# Patient Record
Sex: FEMALE | Race: WHITE | NOT HISPANIC OR LATINO | Employment: FULL TIME | URBAN - METROPOLITAN AREA
[De-identification: names, ages, dates, MRNs, and addresses within clinical notes are randomized per-mention and may not be internally consistent; named-entity substitution may affect disease eponyms.]

---

## 2017-07-13 RX ORDER — METOPROLOL SUCCINATE 50 MG/1
TABLET, EXTENDED RELEASE ORAL
Qty: 90 TABLET | Refills: 3 | OUTPATIENT
Start: 2017-07-13

## 2019-05-06 ENCOUNTER — APPOINTMENT (OUTPATIENT)
Dept: PREADMISSION TESTING | Facility: HOSPITAL | Age: 69
End: 2019-05-06

## 2019-05-06 VITALS
HEART RATE: 77 BPM | TEMPERATURE: 97.6 F | RESPIRATION RATE: 16 BRPM | BODY MASS INDEX: 31.66 KG/M2 | SYSTOLIC BLOOD PRESSURE: 148 MMHG | DIASTOLIC BLOOD PRESSURE: 86 MMHG | WEIGHT: 185.44 LBS | OXYGEN SATURATION: 100 % | HEIGHT: 64 IN

## 2019-05-06 LAB
ALBUMIN SERPL-MCNC: 4.2 G/DL (ref 3.5–5.2)
ALBUMIN/GLOB SERPL: 1.3 G/DL
ALP SERPL-CCNC: 86 U/L (ref 39–117)
ALT SERPL W P-5'-P-CCNC: 18 U/L (ref 1–33)
ANION GAP SERPL CALCULATED.3IONS-SCNC: 12.1 MMOL/L
AST SERPL-CCNC: 21 U/L (ref 1–32)
BASOPHILS # BLD AUTO: 0.06 10*3/MM3 (ref 0–0.2)
BASOPHILS NFR BLD AUTO: 0.6 % (ref 0–1.5)
BILIRUB SERPL-MCNC: 0.6 MG/DL (ref 0.2–1.2)
BUN BLD-MCNC: 11 MG/DL (ref 8–23)
BUN/CREAT SERPL: 16.2 (ref 7–25)
CALCIUM SPEC-SCNC: 9.2 MG/DL (ref 8.6–10.5)
CHLORIDE SERPL-SCNC: 101 MMOL/L (ref 98–107)
CO2 SERPL-SCNC: 23.9 MMOL/L (ref 22–29)
CREAT BLD-MCNC: 0.68 MG/DL (ref 0.57–1)
DEPRECATED RDW RBC AUTO: 45.7 FL (ref 37–54)
EOSINOPHIL # BLD AUTO: 0.19 10*3/MM3 (ref 0–0.4)
EOSINOPHIL NFR BLD AUTO: 2 % (ref 0.3–6.2)
ERYTHROCYTE [DISTWIDTH] IN BLOOD BY AUTOMATED COUNT: 13.3 % (ref 12.3–15.4)
GFR SERPL CREATININE-BSD FRML MDRD: 86 ML/MIN/1.73
GLOBULIN UR ELPH-MCNC: 3.2 GM/DL
GLUCOSE BLD-MCNC: 95 MG/DL (ref 65–99)
HCT VFR BLD AUTO: 41 % (ref 34–46.6)
HGB BLD-MCNC: 12.9 G/DL (ref 12–15.9)
IMM GRANULOCYTES # BLD AUTO: 0.05 10*3/MM3 (ref 0–0.05)
IMM GRANULOCYTES NFR BLD AUTO: 0.5 % (ref 0–0.5)
LYMPHOCYTES # BLD AUTO: 3.11 10*3/MM3 (ref 0.7–3.1)
LYMPHOCYTES NFR BLD AUTO: 32.5 % (ref 19.6–45.3)
MCH RBC QN AUTO: 29.5 PG (ref 26.6–33)
MCHC RBC AUTO-ENTMCNC: 31.5 G/DL (ref 31.5–35.7)
MCV RBC AUTO: 93.8 FL (ref 79–97)
MONOCYTES # BLD AUTO: 0.71 10*3/MM3 (ref 0.1–0.9)
MONOCYTES NFR BLD AUTO: 7.4 % (ref 5–12)
NEUTROPHILS # BLD AUTO: 5.44 10*3/MM3 (ref 1.7–7)
NEUTROPHILS NFR BLD AUTO: 57 % (ref 42.7–76)
NRBC BLD AUTO-RTO: 0 /100 WBC (ref 0–0.2)
PLATELET # BLD AUTO: 433 10*3/MM3 (ref 140–450)
PMV BLD AUTO: 9.4 FL (ref 6–12)
POTASSIUM BLD-SCNC: 3.7 MMOL/L (ref 3.5–5.2)
PROT SERPL-MCNC: 7.4 G/DL (ref 6–8.5)
RBC # BLD AUTO: 4.37 10*6/MM3 (ref 3.77–5.28)
SODIUM BLD-SCNC: 137 MMOL/L (ref 136–145)
WBC NRBC COR # BLD: 9.56 10*3/MM3 (ref 3.4–10.8)

## 2019-05-06 PROCEDURE — 93010 ELECTROCARDIOGRAM REPORT: CPT | Performed by: INTERNAL MEDICINE

## 2019-05-06 PROCEDURE — 80053 COMPREHEN METABOLIC PANEL: CPT | Performed by: ORTHOPAEDIC SURGERY

## 2019-05-06 PROCEDURE — 36415 COLL VENOUS BLD VENIPUNCTURE: CPT

## 2019-05-06 PROCEDURE — 85025 COMPLETE CBC W/AUTO DIFF WBC: CPT | Performed by: ORTHOPAEDIC SURGERY

## 2019-05-06 PROCEDURE — 93005 ELECTROCARDIOGRAM TRACING: CPT

## 2019-05-06 RX ORDER — CLONIDINE HYDROCHLORIDE 0.2 MG/1
0.2 TABLET ORAL 2 TIMES DAILY
Refills: 3 | COMMUNITY
Start: 2019-04-02 | End: 2019-12-02

## 2019-05-06 RX ORDER — CYCLOBENZAPRINE HCL 10 MG
10 TABLET ORAL 3 TIMES DAILY PRN
Refills: 2 | COMMUNITY
Start: 2019-03-14

## 2019-05-06 RX ORDER — METOPROLOL TARTRATE 50 MG/1
100 TABLET, FILM COATED ORAL 2 TIMES DAILY
Refills: 3 | COMMUNITY
Start: 2019-04-02 | End: 2019-12-02

## 2019-05-06 NOTE — DISCHARGE INSTRUCTIONS
Take the following medications the morning of surgery with a small sip of water:     CLONIDINE  METOPROLOL      General Instructions:  • Do not eat solid food after midnight the night before surgery.  • You may drink clear liquids day of surgery but must stop at least one hour before your hospital arrival time @ 0445 AM STOP DRINKING!!!  • It is beneficial for you to have a clear drink that contains carbohydrates the day of surgery.  We suggest a 12 to 20 ounce bottle of Gatorade or Powerade for non-diabetic patients or a 12 to 20 ounce bottle of G2 or Powerade Zero for diabetic patients.     Clear liquids are liquids you can see through.  Nothing red in color.     Plain water                               Sports drinks  Sodas                                   Gelatin (Jell-O)  Fruit juices without pulp such as white grape juice and apple juice  Popsicles that contain no fruit or yogurt  Tea or coffee (no cream or milk added)  Gatorade / Powerade  G2 / Powerade Zero    • Patients who avoid smoking, chewing tobacco and alcohol for 4 weeks prior to surgery have a reduced risk of post-operative complications.  Quit smoking as many days before surgery as you can.  • Do not smoke, use chewing tobacco or drink alcohol the day of surgery.   • If applicable bring your C-PAP/ BI-PAP machine.  • Bring any papers given to you in the doctor’s office.  • Wear clean comfortable clothes and socks.  • Do not wear contact lenses, false eyelashes or make-up.  Bring a case for your glasses.   • Bring crutches or walker if applicable.  • Remove all piercings.  Leave jewelry and any other valuables at home.  • Hair extensions with metal clips must be removed prior to surgery.  • The Pre-Admission Testing nurse will instruct you to bring medications if unable to obtain an accurate list in Pre-Admission Testing.        Preventing a Surgical Site Infection:  • For 2 to 3 days before surgery, avoid shaving with a razor because the razor can  irritate skin and make it easier to develop an infection.    • Any areas of open skin can increase the risk of a post-operative wound infection by allowing bacteria to enter and travel throughout the body.  Notify your surgeon if you have any skin wounds / rashes even if it is not near the expected surgical site.  The area will need assessed to determine if surgery should be delayed until it is healed.  • The night prior to surgery sleep in a clean bed with clean clothing.  Do not allow pets to sleep with you.  • Shower on the morning of surgery using a fresh bar of anti-bacterial soap (such as Dial) and clean washcloth.  Dry with a clean towel and dress in clean clothing.  • Ask your surgeon if you will be receiving antibiotics prior to surgery.  • Make sure you, your family, and all healthcare providers clean their hands with soap and water or an alcohol based hand  before caring for you or your wound.    Day of surgery: 05- ARRIVE @ 0545 AM REPORT TO THE [facility]   Upon arrival, a Pre-op nurse and Anesthesiologist will review your health history, obtain vital signs, and answer questions you may have.  The only belongings needed at this time will be your home medications and if applicable your C-PAP/BI-PAP machine.  If you are staying overnight your family can leave the rest of your belongings in the car and bring them to your room later.  A Pre-op nurse will start an IV and you may receive medication in preparation for surgery, including something to help you relax.  Your family will be able to see you in the Pre-op area.  While you are in surgery your family should notify the waiting room  if they leave the waiting room area and provide a contact phone number.    Please be aware that surgery does come with discomfort.  We want to make every effort to control your discomfort so please discuss any uncontrolled symptoms with your nurse.   Your doctor will most likely have prescribed pain  medications.      If you are going home after surgery you will receive individualized written care instructions before being discharged.  A responsible adult must drive you to and from the hospital on the day of your surgery and stay with you for 24 hours.    If you are staying overnight following surgery, you will be transported to your hospital room following the recovery period.  [facility] has all private rooms.    You have received a list of surgical assistants for your reference.  If you have any questions please call Pre-Admission Testing at [phone].  Deductibles and co-payments are collected on the day of service. Please be prepared to pay the required co-pay, deductible or deposit on the day of service as defined by your plan.

## 2019-05-20 ENCOUNTER — ANESTHESIA (OUTPATIENT)
Dept: PERIOP | Facility: HOSPITAL | Age: 69
End: 2019-05-20

## 2019-05-20 ENCOUNTER — APPOINTMENT (OUTPATIENT)
Dept: GENERAL RADIOLOGY | Facility: HOSPITAL | Age: 69
End: 2019-05-20

## 2019-05-20 ENCOUNTER — HOSPITAL ENCOUNTER (OUTPATIENT)
Facility: HOSPITAL | Age: 69
Discharge: HOME | End: 2019-05-21
Attending: ORTHOPAEDIC SURGERY | Admitting: ORTHOPAEDIC SURGERY

## 2019-05-20 ENCOUNTER — ANESTHESIA EVENT (OUTPATIENT)
Dept: PERIOP | Facility: HOSPITAL | Age: 69
End: 2019-05-20

## 2019-05-20 DIAGNOSIS — Z74.09 IMPAIRED MOBILITY: Primary | ICD-10-CM

## 2019-05-20 PROBLEM — M19.90 OSTEOARTHRITIS: Status: ACTIVE | Noted: 2019-05-20

## 2019-05-20 PROBLEM — M19.071 OSTEOARTHRITIS OF ANKLE AND FOOT, RIGHT: Status: ACTIVE | Noted: 2019-05-20

## 2019-05-20 PROCEDURE — 25010000002 FENTANYL CITRATE (PF) 100 MCG/2ML SOLUTION: Performed by: ANESTHESIOLOGY

## 2019-05-20 PROCEDURE — 25010000003 BUPIVACAINE LIPOSOME 1.3 % SUSPENSION: Performed by: ANESTHESIOLOGY

## 2019-05-20 PROCEDURE — C9290 INJ, BUPIVACAINE LIPOSOME: HCPCS | Performed by: ANESTHESIOLOGY

## 2019-05-20 PROCEDURE — 25010000002 HYDRALAZINE PER 20 MG: Performed by: NURSE ANESTHETIST, CERTIFIED REGISTERED

## 2019-05-20 PROCEDURE — 25010000003 CEFAZOLIN IN DEXTROSE 2-4 GM/100ML-% SOLUTION: Performed by: ORTHOPAEDIC SURGERY

## 2019-05-20 PROCEDURE — G0378 HOSPITAL OBSERVATION PER HR: HCPCS

## 2019-05-20 PROCEDURE — C1769 GUIDE WIRE: HCPCS | Performed by: ORTHOPAEDIC SURGERY

## 2019-05-20 PROCEDURE — C1713 ANCHOR/SCREW BN/BN,TIS/BN: HCPCS | Performed by: ORTHOPAEDIC SURGERY

## 2019-05-20 PROCEDURE — 76000 FLUOROSCOPY <1 HR PHYS/QHP: CPT

## 2019-05-20 PROCEDURE — 25010000002 PROMETHAZINE PER 50 MG: Performed by: NURSE ANESTHETIST, CERTIFIED REGISTERED

## 2019-05-20 PROCEDURE — 25010000002 ONDANSETRON PER 1 MG: Performed by: NURSE ANESTHETIST, CERTIFIED REGISTERED

## 2019-05-20 PROCEDURE — 25010000002 PROPOFOL 10 MG/ML EMULSION: Performed by: NURSE ANESTHETIST, CERTIFIED REGISTERED

## 2019-05-20 PROCEDURE — 83835 ASSAY OF METANEPHRINES: CPT | Performed by: INTERNAL MEDICINE

## 2019-05-20 PROCEDURE — 73620 X-RAY EXAM OF FOOT: CPT

## 2019-05-20 PROCEDURE — 82384 ASSAY THREE CATECHOLAMINES: CPT | Performed by: INTERNAL MEDICINE

## 2019-05-20 PROCEDURE — 25010000002 MIDAZOLAM PER 1 MG: Performed by: ANESTHESIOLOGY

## 2019-05-20 PROCEDURE — 25010000002 HYDROMORPHONE PER 4 MG: Performed by: NURSE ANESTHETIST, CERTIFIED REGISTERED

## 2019-05-20 PROCEDURE — 25010000002 DEXAMETHASONE PER 1 MG: Performed by: NURSE ANESTHETIST, CERTIFIED REGISTERED

## 2019-05-20 PROCEDURE — 82570 ASSAY OF URINE CREATININE: CPT | Performed by: INTERNAL MEDICINE

## 2019-05-20 DEVICE — SCRW MAXLOCK EXTRM NL 4X26MM: Type: IMPLANTABLE DEVICE | Site: FOOT | Status: FUNCTIONAL

## 2019-05-20 DEVICE — IMPLANTABLE DEVICE: Type: IMPLANTABLE DEVICE | Site: FOOT | Status: FUNCTIONAL

## 2019-05-20 DEVICE — SCRW MAXLOCK EXTRM NL 4X16MM: Type: IMPLANTABLE DEVICE | Site: FOOT | Status: FUNCTIONAL

## 2019-05-20 DEVICE — BONE CANC CHIPS 1/4MM 15CC: Type: IMPLANTABLE DEVICE | Site: FOOT | Status: FUNCTIONAL

## 2019-05-20 RX ORDER — HYDRALAZINE HYDROCHLORIDE 20 MG/ML
5 INJECTION INTRAMUSCULAR; INTRAVENOUS
Status: DISCONTINUED | OUTPATIENT
Start: 2019-05-20 | End: 2019-05-20 | Stop reason: HOSPADM

## 2019-05-20 RX ORDER — PROMETHAZINE HYDROCHLORIDE 25 MG/1
25 SUPPOSITORY RECTAL ONCE AS NEEDED
Status: COMPLETED | OUTPATIENT
Start: 2019-05-20 | End: 2019-05-20

## 2019-05-20 RX ORDER — PROPOFOL 10 MG/ML
VIAL (ML) INTRAVENOUS AS NEEDED
Status: DISCONTINUED | OUTPATIENT
Start: 2019-05-20 | End: 2019-05-20 | Stop reason: SURG

## 2019-05-20 RX ORDER — DIPHENHYDRAMINE HYDROCHLORIDE 50 MG/ML
12.5 INJECTION INTRAMUSCULAR; INTRAVENOUS
Status: DISCONTINUED | OUTPATIENT
Start: 2019-05-20 | End: 2019-05-20 | Stop reason: HOSPADM

## 2019-05-20 RX ORDER — FAMOTIDINE 10 MG/ML
20 INJECTION, SOLUTION INTRAVENOUS ONCE
Status: COMPLETED | OUTPATIENT
Start: 2019-05-20 | End: 2019-05-20

## 2019-05-20 RX ORDER — HYDROMORPHONE HYDROCHLORIDE 1 MG/ML
0.5 INJECTION, SOLUTION INTRAMUSCULAR; INTRAVENOUS; SUBCUTANEOUS
Status: CANCELLED | OUTPATIENT
Start: 2019-05-20

## 2019-05-20 RX ORDER — DIPHENHYDRAMINE HCL 25 MG
25 CAPSULE ORAL
Status: DISCONTINUED | OUTPATIENT
Start: 2019-05-20 | End: 2019-05-20 | Stop reason: HOSPADM

## 2019-05-20 RX ORDER — LIDOCAINE HYDROCHLORIDE 20 MG/ML
INJECTION, SOLUTION INFILTRATION; PERINEURAL AS NEEDED
Status: DISCONTINUED | OUTPATIENT
Start: 2019-05-20 | End: 2019-05-20 | Stop reason: SURG

## 2019-05-20 RX ORDER — LISINOPRIL AND HYDROCHLOROTHIAZIDE 12.5; 1 MG/1; MG/1
1 TABLET ORAL DAILY
COMMUNITY
End: 2023-03-28

## 2019-05-20 RX ORDER — ACETAMINOPHEN 325 MG/1
650 TABLET ORAL ONCE AS NEEDED
Status: DISCONTINUED | OUTPATIENT
Start: 2019-05-20 | End: 2019-05-20 | Stop reason: HOSPADM

## 2019-05-20 RX ORDER — HYDROMORPHONE HYDROCHLORIDE 1 MG/ML
0.5 INJECTION, SOLUTION INTRAMUSCULAR; INTRAVENOUS; SUBCUTANEOUS
Status: DISCONTINUED | OUTPATIENT
Start: 2019-05-20 | End: 2019-05-20 | Stop reason: HOSPADM

## 2019-05-20 RX ORDER — CLONIDINE HYDROCHLORIDE 0.1 MG/1
0.2 TABLET ORAL 2 TIMES DAILY
Status: DISCONTINUED | OUTPATIENT
Start: 2019-05-20 | End: 2019-05-21 | Stop reason: HOSPADM

## 2019-05-20 RX ORDER — HYDROCODONE BITARTRATE AND ACETAMINOPHEN 7.5; 325 MG/1; MG/1
1 TABLET ORAL ONCE AS NEEDED
Status: CANCELLED | OUTPATIENT
Start: 2019-05-20

## 2019-05-20 RX ORDER — PROMETHAZINE HYDROCHLORIDE 25 MG/ML
6.25 INJECTION, SOLUTION INTRAMUSCULAR; INTRAVENOUS ONCE AS NEEDED
Status: CANCELLED | OUTPATIENT
Start: 2019-05-20

## 2019-05-20 RX ORDER — FENTANYL CITRATE 50 UG/ML
100 INJECTION, SOLUTION INTRAMUSCULAR; INTRAVENOUS
Status: DISCONTINUED | OUTPATIENT
Start: 2019-05-20 | End: 2019-05-20 | Stop reason: HOSPADM

## 2019-05-20 RX ORDER — CYCLOBENZAPRINE HCL 10 MG
10 TABLET ORAL 3 TIMES DAILY PRN
Status: DISCONTINUED | OUTPATIENT
Start: 2019-05-20 | End: 2019-05-21 | Stop reason: HOSPADM

## 2019-05-20 RX ORDER — FENTANYL CITRATE 50 UG/ML
50 INJECTION, SOLUTION INTRAMUSCULAR; INTRAVENOUS
Status: DISCONTINUED | OUTPATIENT
Start: 2019-05-20 | End: 2019-05-20 | Stop reason: HOSPADM

## 2019-05-20 RX ORDER — EPHEDRINE SULFATE 50 MG/ML
5 INJECTION, SOLUTION INTRAVENOUS ONCE AS NEEDED
Status: DISCONTINUED | OUTPATIENT
Start: 2019-05-20 | End: 2019-05-20 | Stop reason: HOSPADM

## 2019-05-20 RX ORDER — MIDAZOLAM HYDROCHLORIDE 1 MG/ML
1 INJECTION INTRAMUSCULAR; INTRAVENOUS
Status: DISCONTINUED | OUTPATIENT
Start: 2019-05-20 | End: 2019-05-20 | Stop reason: HOSPADM

## 2019-05-20 RX ORDER — MORPHINE SULFATE 2 MG/ML
8 INJECTION, SOLUTION INTRAMUSCULAR; INTRAVENOUS EVERY 4 HOURS PRN
Status: DISCONTINUED | OUTPATIENT
Start: 2019-05-30 | End: 2019-05-21 | Stop reason: HOSPADM

## 2019-05-20 RX ORDER — ASPIRIN 325 MG
325 TABLET ORAL DAILY
Status: DISCONTINUED | OUTPATIENT
Start: 2019-05-21 | End: 2019-05-21 | Stop reason: HOSPADM

## 2019-05-20 RX ORDER — DEXAMETHASONE SODIUM PHOSPHATE 10 MG/ML
INJECTION INTRAMUSCULAR; INTRAVENOUS AS NEEDED
Status: DISCONTINUED | OUTPATIENT
Start: 2019-05-20 | End: 2019-05-20 | Stop reason: SURG

## 2019-05-20 RX ORDER — CEFAZOLIN SODIUM 2 G/100ML
2 INJECTION, SOLUTION INTRAVENOUS EVERY 8 HOURS
Status: DISCONTINUED | OUTPATIENT
Start: 2019-05-20 | End: 2019-05-21 | Stop reason: HOSPADM

## 2019-05-20 RX ORDER — PROMETHAZINE HYDROCHLORIDE 25 MG/ML
6.25 INJECTION, SOLUTION INTRAMUSCULAR; INTRAVENOUS
Status: COMPLETED | OUTPATIENT
Start: 2019-05-20 | End: 2019-05-20

## 2019-05-20 RX ORDER — OXYCODONE AND ACETAMINOPHEN 7.5; 325 MG/1; MG/1
1 TABLET ORAL ONCE AS NEEDED
Status: DISCONTINUED | OUTPATIENT
Start: 2019-05-20 | End: 2019-05-20 | Stop reason: HOSPADM

## 2019-05-20 RX ORDER — ONDANSETRON 2 MG/ML
4 INJECTION INTRAMUSCULAR; INTRAVENOUS ONCE AS NEEDED
Status: CANCELLED | OUTPATIENT
Start: 2019-05-20

## 2019-05-20 RX ORDER — SODIUM CHLORIDE 0.9 % (FLUSH) 0.9 %
3 SYRINGE (ML) INJECTION EVERY 12 HOURS SCHEDULED
Status: DISCONTINUED | OUTPATIENT
Start: 2019-05-20 | End: 2019-05-20 | Stop reason: HOSPADM

## 2019-05-20 RX ORDER — FENTANYL CITRATE 50 UG/ML
50 INJECTION, SOLUTION INTRAMUSCULAR; INTRAVENOUS
Status: CANCELLED | OUTPATIENT
Start: 2019-05-20

## 2019-05-20 RX ORDER — PROMETHAZINE HYDROCHLORIDE 25 MG/1
25 TABLET ORAL ONCE AS NEEDED
Status: CANCELLED | OUTPATIENT
Start: 2019-05-20

## 2019-05-20 RX ORDER — PROMETHAZINE HYDROCHLORIDE 25 MG/ML
25 INJECTION, SOLUTION INTRAMUSCULAR; INTRAVENOUS EVERY 4 HOURS PRN
Status: DISCONTINUED | OUTPATIENT
Start: 2019-05-20 | End: 2019-05-21 | Stop reason: HOSPADM

## 2019-05-20 RX ORDER — SODIUM CHLORIDE 0.9 % (FLUSH) 0.9 %
1-10 SYRINGE (ML) INJECTION AS NEEDED
Status: DISCONTINUED | OUTPATIENT
Start: 2019-05-20 | End: 2019-05-20 | Stop reason: HOSPADM

## 2019-05-20 RX ORDER — PROMETHAZINE HYDROCHLORIDE 25 MG/1
25 SUPPOSITORY RECTAL ONCE AS NEEDED
Status: CANCELLED | OUTPATIENT
Start: 2019-05-20

## 2019-05-20 RX ORDER — METOPROLOL TARTRATE 100 MG/1
100 TABLET ORAL 2 TIMES DAILY
Status: DISCONTINUED | OUTPATIENT
Start: 2019-05-20 | End: 2019-05-21 | Stop reason: HOSPADM

## 2019-05-20 RX ORDER — ACETAMINOPHEN 650 MG/1
650 SUPPOSITORY RECTAL ONCE AS NEEDED
Status: DISCONTINUED | OUTPATIENT
Start: 2019-05-20 | End: 2019-05-20 | Stop reason: HOSPADM

## 2019-05-20 RX ORDER — BUPIVACAINE HYDROCHLORIDE 2.5 MG/ML
INJECTION, SOLUTION EPIDURAL; INFILTRATION; INTRACAUDAL
Status: COMPLETED | OUTPATIENT
Start: 2019-05-20 | End: 2019-05-20

## 2019-05-20 RX ORDER — FLUMAZENIL 0.1 MG/ML
0.2 INJECTION INTRAVENOUS AS NEEDED
Status: CANCELLED | OUTPATIENT
Start: 2019-05-20

## 2019-05-20 RX ORDER — MORPHINE SULFATE 2 MG/ML
4 INJECTION, SOLUTION INTRAMUSCULAR; INTRAVENOUS EVERY 4 HOURS PRN
Status: DISCONTINUED | OUTPATIENT
Start: 2019-05-20 | End: 2019-05-21 | Stop reason: HOSPADM

## 2019-05-20 RX ORDER — SODIUM CHLORIDE, SODIUM LACTATE, POTASSIUM CHLORIDE, CALCIUM CHLORIDE 600; 310; 30; 20 MG/100ML; MG/100ML; MG/100ML; MG/100ML
9 INJECTION, SOLUTION INTRAVENOUS CONTINUOUS
Status: DISCONTINUED | OUTPATIENT
Start: 2019-05-20 | End: 2019-05-21 | Stop reason: HOSPADM

## 2019-05-20 RX ORDER — PROMETHAZINE HYDROCHLORIDE 25 MG/1
25 TABLET ORAL EVERY 4 HOURS PRN
Status: DISCONTINUED | OUTPATIENT
Start: 2019-05-20 | End: 2019-05-21 | Stop reason: HOSPADM

## 2019-05-20 RX ORDER — CEFAZOLIN SODIUM 2 G/100ML
2 INJECTION, SOLUTION INTRAVENOUS ONCE
Status: COMPLETED | OUTPATIENT
Start: 2019-05-20 | End: 2019-05-20

## 2019-05-20 RX ORDER — BUPIVACAINE HYDROCHLORIDE 5 MG/ML
INJECTION, SOLUTION EPIDURAL; INTRACAUDAL
Status: COMPLETED | OUTPATIENT
Start: 2019-05-20 | End: 2019-05-20

## 2019-05-20 RX ORDER — ONDANSETRON 2 MG/ML
4 INJECTION INTRAMUSCULAR; INTRAVENOUS ONCE AS NEEDED
Status: DISCONTINUED | OUTPATIENT
Start: 2019-05-20 | End: 2019-05-20 | Stop reason: HOSPADM

## 2019-05-20 RX ORDER — LIDOCAINE HYDROCHLORIDE 10 MG/ML
0.5 INJECTION, SOLUTION EPIDURAL; INFILTRATION; INTRACAUDAL; PERINEURAL ONCE AS NEEDED
Status: DISCONTINUED | OUTPATIENT
Start: 2019-05-20 | End: 2019-05-20 | Stop reason: HOSPADM

## 2019-05-20 RX ORDER — HYDROCODONE BITARTRATE AND ACETAMINOPHEN 7.5; 325 MG/1; MG/1
1 TABLET ORAL ONCE AS NEEDED
Status: DISCONTINUED | OUTPATIENT
Start: 2019-05-20 | End: 2019-05-20 | Stop reason: HOSPADM

## 2019-05-20 RX ORDER — OXYCODONE HYDROCHLORIDE AND ACETAMINOPHEN 5; 325 MG/1; MG/1
2 TABLET ORAL EVERY 4 HOURS PRN
Status: DISCONTINUED | OUTPATIENT
Start: 2019-05-20 | End: 2019-05-21 | Stop reason: HOSPADM

## 2019-05-20 RX ORDER — MIDAZOLAM HYDROCHLORIDE 1 MG/ML
2 INJECTION INTRAMUSCULAR; INTRAVENOUS
Status: DISCONTINUED | OUTPATIENT
Start: 2019-05-20 | End: 2019-05-20 | Stop reason: HOSPADM

## 2019-05-20 RX ORDER — PROMETHAZINE HYDROCHLORIDE 25 MG/1
25 TABLET ORAL ONCE AS NEEDED
Status: COMPLETED | OUTPATIENT
Start: 2019-05-20 | End: 2019-05-20

## 2019-05-20 RX ORDER — BACITRACIN 50000 [IU]/1
INJECTION, POWDER, FOR SOLUTION INTRAMUSCULAR
Status: DISPENSED
Start: 2019-05-20 | End: 2019-05-20

## 2019-05-20 RX ORDER — LABETALOL HYDROCHLORIDE 5 MG/ML
5 INJECTION, SOLUTION INTRAVENOUS
Status: DISCONTINUED | OUTPATIENT
Start: 2019-05-20 | End: 2019-05-20 | Stop reason: HOSPADM

## 2019-05-20 RX ORDER — EPHEDRINE SULFATE 50 MG/ML
5 INJECTION, SOLUTION INTRAVENOUS ONCE AS NEEDED
Status: CANCELLED | OUTPATIENT
Start: 2019-05-20

## 2019-05-20 RX ORDER — NALOXONE HCL 0.4 MG/ML
0.2 VIAL (ML) INJECTION AS NEEDED
Status: DISCONTINUED | OUTPATIENT
Start: 2019-05-20 | End: 2019-05-20 | Stop reason: HOSPADM

## 2019-05-20 RX ORDER — FLUMAZENIL 0.1 MG/ML
0.2 INJECTION INTRAVENOUS AS NEEDED
Status: DISCONTINUED | OUTPATIENT
Start: 2019-05-20 | End: 2019-05-20 | Stop reason: HOSPADM

## 2019-05-20 RX ORDER — OXYCODONE AND ACETAMINOPHEN 7.5; 325 MG/1; MG/1
1 TABLET ORAL EVERY 4 HOURS PRN
Status: DISCONTINUED | OUTPATIENT
Start: 2019-05-20 | End: 2019-05-21 | Stop reason: HOSPADM

## 2019-05-20 RX ORDER — CEFAZOLIN SODIUM 2 G/100ML
2 INJECTION, SOLUTION INTRAVENOUS EVERY 8 HOURS
Status: DISCONTINUED | OUTPATIENT
Start: 2019-05-20 | End: 2019-05-20 | Stop reason: HOSPADM

## 2019-05-20 RX ORDER — ONDANSETRON 2 MG/ML
INJECTION INTRAMUSCULAR; INTRAVENOUS AS NEEDED
Status: DISCONTINUED | OUTPATIENT
Start: 2019-05-20 | End: 2019-05-20 | Stop reason: SURG

## 2019-05-20 RX ORDER — PROMETHAZINE HYDROCHLORIDE 25 MG/ML
12.5 INJECTION, SOLUTION INTRAMUSCULAR; INTRAVENOUS ONCE AS NEEDED
Status: COMPLETED | OUTPATIENT
Start: 2019-05-20 | End: 2019-05-20

## 2019-05-20 RX ORDER — ZOLPIDEM TARTRATE 5 MG/1
5 TABLET ORAL NIGHTLY PRN
Status: DISCONTINUED | OUTPATIENT
Start: 2019-05-20 | End: 2019-05-21 | Stop reason: HOSPADM

## 2019-05-20 RX ADMIN — BUPIVACAINE HYDROCHLORIDE 10 ML: 2.5 INJECTION, SOLUTION EPIDURAL; INFILTRATION; INTRACAUDAL; PERINEURAL at 06:49

## 2019-05-20 RX ADMIN — HYDROMORPHONE HYDROCHLORIDE 0.5 MG: 1 INJECTION, SOLUTION INTRAMUSCULAR; INTRAVENOUS; SUBCUTANEOUS at 09:25

## 2019-05-20 RX ADMIN — BUPIVACAINE 10 ML: 13.3 INJECTION, SUSPENSION, LIPOSOMAL INFILTRATION at 06:49

## 2019-05-20 RX ADMIN — HYDRALAZINE HYDROCHLORIDE 5 MG: 20 INJECTION INTRAMUSCULAR; INTRAVENOUS at 09:35

## 2019-05-20 RX ADMIN — MIDAZOLAM 2 MG: 1 INJECTION INTRAMUSCULAR; INTRAVENOUS at 06:23

## 2019-05-20 RX ADMIN — DEXAMETHASONE SODIUM PHOSPHATE 4 MG: 10 INJECTION INTRAMUSCULAR; INTRAVENOUS at 08:53

## 2019-05-20 RX ADMIN — LIDOCAINE HYDROCHLORIDE 100 MG: 20 INJECTION, SOLUTION INFILTRATION; PERINEURAL at 07:26

## 2019-05-20 RX ADMIN — CEFAZOLIN SODIUM 2 G: 2 INJECTION, SOLUTION INTRAVENOUS at 17:24

## 2019-05-20 RX ADMIN — PROPOFOL 50 MG: 10 INJECTION, EMULSION INTRAVENOUS at 08:58

## 2019-05-20 RX ADMIN — FAMOTIDINE 20 MG: 10 INJECTION INTRAVENOUS at 06:21

## 2019-05-20 RX ADMIN — ONDANSETRON 4 MG: 2 INJECTION INTRAMUSCULAR; INTRAVENOUS at 08:53

## 2019-05-20 RX ADMIN — FENTANYL CITRATE 50 MCG: 50 INJECTION, SOLUTION INTRAMUSCULAR; INTRAVENOUS at 06:34

## 2019-05-20 RX ADMIN — OXYCODONE HYDROCHLORIDE AND ACETAMINOPHEN 1 TABLET: 7.5; 325 TABLET ORAL at 17:22

## 2019-05-20 RX ADMIN — CEFAZOLIN SODIUM 2 G: 2 INJECTION, SOLUTION INTRAVENOUS at 07:22

## 2019-05-20 RX ADMIN — METOPROLOL TARTRATE 100 MG: 100 TABLET, FILM COATED ORAL at 21:10

## 2019-05-20 RX ADMIN — BUPIVACAINE HYDROCHLORIDE 10 ML: 5 INJECTION, SOLUTION EPIDURAL; INTRACAUDAL; PERINEURAL at 06:49

## 2019-05-20 RX ADMIN — PROMETHAZINE HYDROCHLORIDE 6.25 MG: 25 INJECTION INTRAMUSCULAR; INTRAVENOUS at 09:50

## 2019-05-20 RX ADMIN — SODIUM CHLORIDE, POTASSIUM CHLORIDE, SODIUM LACTATE AND CALCIUM CHLORIDE 9 ML/HR: 600; 310; 30; 20 INJECTION, SOLUTION INTRAVENOUS at 06:09

## 2019-05-20 RX ADMIN — PROMETHAZINE HYDROCHLORIDE 6.25 MG: 25 INJECTION INTRAMUSCULAR; INTRAVENOUS at 10:35

## 2019-05-20 RX ADMIN — CYCLOBENZAPRINE 10 MG: 10 TABLET, FILM COATED ORAL at 14:28

## 2019-05-20 RX ADMIN — LISINOPRIL: 10 TABLET ORAL at 21:10

## 2019-05-20 RX ADMIN — PROPOFOL 200 MG: 10 INJECTION, EMULSION INTRAVENOUS at 07:26

## 2019-05-20 RX ADMIN — HYDRALAZINE HYDROCHLORIDE 5 MG: 20 INJECTION INTRAMUSCULAR; INTRAVENOUS at 09:25

## 2019-05-20 NOTE — SIGNIFICANT NOTE
Pt c/o severe headache. Per CRNA, pt b/p elevated prior to sx and during sx. Rec'd hydralazine to help lower b/p and w/possible HA r/t b/p. Hydralazine and Dilaudid given.  Pt moaning in pain/discomfort. Also c/o nausea. Cold washcloth applied to forehead/over eyes.

## 2019-05-20 NOTE — PLAN OF CARE
Problem: Patient Care Overview  Goal: Plan of Care Review  Outcome: Ongoing (interventions implemented as appropriate)   05/20/19 1533   Coping/Psychosocial   Plan of Care Reviewed With patient   Plan of Care Review   Progress improving   OTHER   Outcome Summary Arrived from [facility] post op Right foot sx. [facility] consulted due to HTN. Hydralazine 10mg given during PACU plus pt took home antihypertensive meds at 4am. R foot numb due to block. //92 since arrival to floor. Voiding. Denies pain.        Problem: Fall Risk (Adult)  Goal: Identify Related Risk Factors and Signs and Symptoms  Outcome: Outcome(s) achieved Date Met: 05/20/19    Goal: Absence of Fall  Outcome: Ongoing (interventions implemented as appropriate)      Problem: Surgery Nonspecified (Adult)  Goal: Signs and Symptoms of Listed Potential Problems Will be Absent, Minimized or Managed (Surgery Nonspecified)  Outcome: Ongoing (interventions implemented as appropriate)

## 2019-05-20 NOTE — ANESTHESIA PROCEDURE NOTES
Airway  Urgency: elective    Airway not difficult    General Information and Staff    Patient location during procedure: OR  Anesthesiologist: Giovany Mcelroy MD  CRNA: Janae Landaverde CRNA    Indications and Patient Condition  Indications for airway management: airway protection    Preoxygenated: yes  MILS not maintained throughout  Mask difficulty assessment: 1 - vent by mask    Final Airway Details  Final airway type: supraglottic airway      Successful airway: classic  Size 4    Number of attempts at approach: 1    Additional Comments  Lma insertion appears atraumatic. Dentition intact.

## 2019-05-20 NOTE — ANESTHESIA PROCEDURE NOTES
Peripheral Block    Pre-sedation assessment completed: 5/20/2019 6:36 AM    Patient reassessed immediately prior to procedure    Patient location during procedure: pre-op  Start time: 5/20/2019 6:36 AM  Stop time: 5/20/2019 6:48 AM  Reason for block: at surgeon's request and post-op pain management  Performed by  Anesthesiologist: Giovany Mcelroy MD  Preanesthetic Checklist  Completed: patient identified, site marked, surgical consent, pre-op evaluation, timeout performed, IV checked, risks and benefits discussed and monitors and equipment checked  Prep:  Pt Position: supine  Sterile barriers:cap, gloves, mask and sterile barriers  Prep: ChloraPrep  Patient monitoring: blood pressure monitoring, continuous pulse oximetry and EKG  Procedure  Sedation:yes  Performed under: local infiltration  Guidance:ultrasound guided  ULTRASOUND INTERPRETATION.  Using ultrasound guidance a 22 G gauge needle was placed in close proximity to the femoral and sciatic nerve, at which point, under ultrasound guidance anesthetic was injected in the area of the nerve and spread of the anesthesia was seen on ultrasound in close proximity thereto.  There were no abnormalities seen on ultrasound; a digital image was taken; and the patient tolerated the procedure with no complications. Images:still images obtained    Laterality:right  Block Type:adductor canal block, popliteal and sciatic  Injection Technique:single-shot  Needle Type:echogenic  Needle Gauge:22 G  Resistance on Injection: less than 15 psi  Medications Used: bupivacaine liposome (EXPAREL) 1.3 % injection, 10 mL  bupivacaine (PF) (MARCAINE) 0.5 % injection, 10 mL  bupivacaine PF (MARCAINE) 0.25 % injection, 10 mL  Med admintered at 5/20/2019 6:49 AM  Post Assessment  Injection Assessment: negative aspiration for heme, no paresthesia on injection and incremental injection  Patient Tolerance:comfortable throughout block  Complications:no

## 2019-05-20 NOTE — OP NOTE
Preoperative diagnosis: Severe osteoarthritis multiple right midfoot joints with subluxation, collapse of the medial longitudinal arch, and right ankle contracture.    Postoperative diagnosis: Same    Procedure: Right peroneus brevis to peroneus longus tendon transfer, right tendo Achilles lengthening, multiple right midfoot open reduction and arthrodeses with bone graft.  Joints fused include the first and second tarsometatarsal, the first intermetatarsal and first intercuneiform, and the articulation between the medial cuneiform and second metatarsal base.    General anesthesia with a block for postoperative pain relief    Surgeon: Awais Ledezma MD    Assistant: CORIE Marlow    No complication specimen or drain    A skilled first surgical assistant, namely CORIE Marlow, was necessary for the safe completion of this procedure in a timely fashion.  Hillary was instrumental in patient positioning and surgical field exposure, protecting vital neurovascular structures and tendons while I worked.  She helped  me hold the bones in perfect position and alignment as I provided internal fixation.    Technique: After a preoperative right lower extremity nerve block is done and after appropriate preoperative consultation, the patient is brought to the operating room and made comfortable on the operating room table.  Parenteral antibiotics were given and a surgical pause was undertaken to identify appropriate patient,  surgical site, and surgeon.  After appropriate anesthesia, a well-padded right proximal thigh tourniquet is applied.  A well-padded bump was placed behind the right hip in order to internally rotate the right lower extremity for the first portion of this procedure.  Intraoperative C-arm fluoroscopic images spot films and plain films are available for reference throughout the case.  Sterile prep and drape are done for the right lower extremity distal to the knee in the usual sterile free fashion.  The  limb is elevated, partially exsanguinated by gravity, and the tourniquet inflated to 250 mmHg.    The first incision is 3 and half centimeters in length at the supramalleolar level just behind the right fibula through skin and dermis only.  Blunt dissection is taken deep to the dermis protecting cuticular nerves and using cautery for hemostasis.  The sheath overlying both the peroneus brevis and peroneus longus tendons and opened.  I identified the peroneus brevis tendon and transected it at the appropriate level, leaving the muscle belly intact.  In a Pulvertaft weave fashion at the appropriate length and tension, the peroneus brevis tendon is inserted and transferred into an woven into the peroneus longus, suturing with 0 Ethibond interrupted figure-of-eight sutures as we went along.  The knots were buried.  This wound was irrigated and hemostasis checked.    Next, a judicious lengthening, without over correction, of the tendo Achilles was done with a 15 blade through 3 separate incisions correcting the remainder of the patient's preoperative contracture.  These wounds were irrigated.  These wounds were closed with 2-0 Vicryl and skin staples.    Next, the bump was removed from behind the patient's right hip so we could address the medial foot and ankle.  Medial midline longitudinal incision was made connecting the hindfoot with the proximal forefoot.  Incisions made through skin and dermis only and blunt dissection taken deep to the dermis, protecting cuticular nerves and using cautery as needed for hemostasis.  Severe deformity and subluxation are noted.  Subcapsular exposure of the arthritic joints involved is done placing retractors as needed.  A microsagittal saw and chisel were used to remove the plantar prominence.  This bone is mixed with the patient's own blood and 15 cc freeze-dried cancellus allograft bone to be used later in the case through a separate incision.    I was able to dissect medially and  dorsal at the first TMT naviculocuneiform level without stripping vital periosteal and capsular attachment dorsally and laterally.  A lamina  was used to facilitate the dissection as we performed an open reduction of the first and second TMT joints first intercuneiform joint and the articulation of the medial cuneiform at the base of the second metatarsal.  I removed hard subchondral bone and disease cartilage so that the arch could be reconstituted without over correction and the fusion site coapted under compression.  Wound was copiously irrigated.  Hemostasis was checked and then dozens of fenestrations were drilled across both sides of all fusion sites in order to encourage bony ingrowth and union.    While her bone was slightly osteopenic, fixation was quite rigid.  Guidewires from the Ortho helix 4 mm cannulated screw set were passed and their position and length ascertained with fluoroscopic guidance.  2 separate screws were passed and then I contoured an Ortho helix straight recon plate to the plantar aspect.  This was then anchored with further compression through the dynamic compression slot and for bicortical cancellus screws.  This made quite a rigid construct.    Should be noted the bone graft was inserted across all fusion sites before compression and fixation were achieved.  Fluoroscopic images in multiple projections and plain films in multiple projections were consistent with satisfactory alignment position reduction and fixation.  The foot was indeed plantigrade.  Hemostasis was obtained.  Further bone graft was tamped in the first and second TMT fusion sites.    Wound was then closed with 0 Vicryl 2-0 Vicryl and skin staples.  Circulation had returned promptly upon releasing the tourniquet.  Hemostasis was checked.  I was informed that the sponge and needle count was correct.  A sterile dressing was applied including adequate padding posterior plaster splint with metatarsal extension and  Ace bandages over the top.  Patient left the operating room comfortable with her block in effect after having tolerated procedure well.

## 2019-05-20 NOTE — ANESTHESIA PREPROCEDURE EVALUATION
Anesthesia Evaluation     Patient summary reviewed and Nursing notes reviewed   NPO Solid Status: > 8 hours             Airway   Mallampati: II  TM distance: >3 FB  Neck ROM: full  no difficulty expected  Dental - normal exam     Pulmonary - negative pulmonary ROS and normal exam   Cardiovascular - normal exam    (+) hypertension,       Neuro/Psych- negative ROS  GI/Hepatic/Renal/Endo - negative ROS     Musculoskeletal (-) negative ROS    Abdominal  - normal exam   Substance History - negative use     OB/GYN negative ob/gyn ROS         Other                        Anesthesia Plan    ASA 2     general   (Sci/fem popc)  intravenous induction   Anesthetic plan, all risks, benefits, and alternatives have been provided, discussed and informed consent has been obtained with: patient.    Plan discussed with CRNA.

## 2019-05-20 NOTE — ANESTHESIA POSTPROCEDURE EVALUATION
Patient: Loan Pena    Procedure Summary     Date:  05/20/19 Room / Location:   [facility]  [facility]    Anesthesia Start:  0723 Anesthesia Stop:  0916    Procedures:       RIGHT PERONEAL TENDON TRANSFER RIGHT TENDONACHILLES LENGTHENING (Right Ankle)      MULITPLE RIGHT MID FOOT ARTHRODESIS WITH BONE GRAFT REALIGNMENT OSTEOTMIES (Right Ankle) Diagnosis:      Surgeon:  Awais Ledezma MD Provider:  Giovany Mcelroy MD    Anesthesia Type:  general ASA Status:  2          Anesthesia Type: general  Last vitals  BP   154/80 (05/20/19 0935)   Temp   36.4 °C (97.5 °F) (05/20/19 0913)   Pulse   62 (05/20/19 0935)   Resp   16 (05/20/19 0935)     SpO2   99 % (05/20/19 0935)     Post Anesthesia Care and Evaluation    Patient location during evaluation: bedside  Patient participation: complete - patient participated  Level of consciousness: awake  Pain score: 1  Pain management: adequate  Airway patency: patent  Anesthetic complications: No anesthetic complications    Cardiovascular status: acceptable  Respiratory status: acceptable  Hydration status: acceptable    Comments: --------------------            05/20/19 0935     --------------------   BP:       154/80     Pulse:      62       Resp:       16       Temp:                SpO2:      99%      --------------------

## 2019-05-20 NOTE — CONSULTS
Name: Loan Pena ADMIT: 2019   : 1950  PCP: Spencer Pena MD    MRN: [id number] LOS: 0 days   AGE/SEX: 69 y.o. female  ROOM: 87/1     No chief complaint on file.  Consult for hypertension    Subjective   Ms. Pena is a 69 y.o. female admitted to orthopedic surgery and underwent procedure on her right foot earlier today.  Postoperatively she had elevated blood pressure and was given IV hydralazine in addition to resumption of her home medications.  On repeat blood pressure check while I was in the room her systolic pressure had improved to the 150s.  She is reporting intermittent headache which she states she has when her blood pressure is elevated at home.  She has had difficulty controlling blood pressure at home.  She previously had good response to hydralazine however she developed raynaud's phenomenon and her  who is an ER physician at the [facility] was concerned about possible development of drug-induced lupus.  She was taken off of that and is now requiring 4 different hypertensive medications.  Despite these medications they report only moderate control at home.  She does not have a primary care physician and her  has been helping with medication titration.  She denies chest pain palpitation shortness of breath nausea vomiting diarrhea and abdominal pain currently.  No flank pain or difficulty urinating.  They are asking about secondary hypertension work-up and this certainly seems indicated considering she has resistant hypertension with 4 medications.  I discussed that I can send the initial work-up although the screening for pheochromocytoma and renal aldosterone assay will probably not be back with the time she leaves the hospital.  They were okay with this.    History of Present Illness    Past Medical History:   Diagnosis Date   • Arthritis     OSTEO   • History of peptic ulcer disease     TREATED PREVACID, AMOXICILLIN AND PEPTOBISMOL    • Hypertension      Past  Surgical History:   Procedure Laterality Date   • BRANCHIAL CLEFT CYST EXCISION Right    • COLONOSCOPY     • ENDOSCOPY     • LASIK Bilateral    • SINUS SURGERY       Family History   Problem Relation Age of Onset   • Malig Hyperthermia Neg Hx      Social History     Tobacco Use   • Smoking status: Never Smoker   • Smokeless tobacco: Never Used   Substance Use Topics   • Alcohol use: No     Frequency: Never   • Drug use: No     Medications Prior to Admission   Medication Sig Dispense Refill Last Dose   • CloNIDine (CATAPRES) 0.2 MG tablet Take 0.2 mg by mouth 2 (Two) Times a Day.  3 5/19/2019 at 0900   • cyclobenzaprine (FLEXERIL) 10 MG tablet Take 10 mg by mouth 3 (Three) Times a Day As Needed.  2 Past Month at Unknown time   • lisinopril-hydrochlorothiazide (PRINZIDE,ZESTORETIC) 10-12.5 MG per tablet Take 1 tablet by mouth 2 (Two) Times a Day.   5/20/2019 at 0400   • metoprolol tartrate (LOPRESSOR) 50 MG tablet Take 100 mg by mouth 2 (Two) Times a Day.  3 5/20/2019 at 0400     Allergies:  No Known Allergies    Review of Systems   Constitutional: Negative for diaphoresis and unexpected weight change.   HENT: Negative for sore throat and trouble swallowing.    Eyes: Negative for pain and visual disturbance.   Respiratory: Negative for cough, shortness of breath and wheezing.    Cardiovascular: Negative for chest pain and palpitations.   Gastrointestinal: Positive for nausea (improved). Negative for constipation, diarrhea and vomiting.   Endocrine: Negative for cold intolerance and heat intolerance.   Genitourinary: Negative for difficulty urinating and dysuria.   Musculoskeletal: Negative for neck pain and neck stiffness.   Skin: Negative for pallor and rash.   Allergic/Immunologic: Negative for environmental allergies and food allergies.   Neurological: Negative for seizures and syncope.   Hematological: Negative for adenopathy. Does not bruise/bleed easily.   Psychiatric/Behavioral: Negative for agitation and  confusion.        Objective    Vital Signs  Temp:  [97.2 °F (36.2 °C)-98 °F (36.7 °C)] 97.8 °F (36.6 °C)  Heart Rate:  [59-83] 83  Resp:  [16] 16  BP: (111-196)/() 153/90  SpO2:  [96 %-100 %] 97 %  on  Flow (L/min):  [1-4] 2;   Device (Oxygen Therapy): nasal cannula  Body mass index is 31.09 kg/m².    Physical Exam   Constitutional: She is oriented to person, place, and time. She appears well-developed. No distress.   HENT:   Head: Atraumatic.   Nose: Nose normal.   Eyes: Conjunctivae and EOM are normal.   Neck: Normal range of motion. Neck supple.   Cardiovascular: Normal rate and regular rhythm.   Pulmonary/Chest: Effort normal. She has no wheezes. She has no rales.   Abdominal: Soft. She exhibits no distension. There is no tenderness. There is no rebound and no guarding.   Musculoskeletal: She exhibits no edema.   Right lower extremity dressed   Neurological: She is alert and oriented to person, place, and time.   Skin: Skin is warm and dry. She is not diaphoretic.   Psychiatric: She has a normal mood and affect. Her behavior is normal.   Nursing note and vitals reviewed.      Results Review:  I reviewed the patient's new clinical results.  I reviewed imaging, agree with interpretation.  I reviewed prior records.    Lab Results (last 24 hours)     ** No results found for the last 24 hours. **          XR Foot 2 View Right   Final Result      FL C Arm During Surgery   Final Result        Assessment/Plan      Active Hospital Problems    Diagnosis  POA   • Osteoarthritis of ankle and foot, right [M19.071]  Yes   • Osteoarthritis [M19.90]  Yes      Resolved Hospital Problems   No resolved problems to display.     - Hypertension: She may have resistant hypertension and should undergo a secondary hypertension work-up.  Will order renal artery duplex, renin/aldosterone, and metanephrine/catecholamine. Continue her home regimen for now and will monitor. Check BMP in the morning.  - Headache: Monitor with blood  pressure improvement.  - Osteoarthritis of Ankle/Foot: per primary service    Thank you for the consult and allowing me to participate in Ms Pena's care. Will continue to follow. Please call with any questions or concerns.    Most of HTN workup can be continued as an outpatient. Consult CCP to aid with obtaining PCP.  I discussed the patients findings and my recommendations with patient and family.    Mitesh Reddy MD  [facility]  05/20/19  4:18 PM

## 2019-05-21 ENCOUNTER — APPOINTMENT (OUTPATIENT)
Dept: CARDIOLOGY | Facility: HOSPITAL | Age: 69
End: 2019-05-21

## 2019-05-21 VITALS
DIASTOLIC BLOOD PRESSURE: 76 MMHG | SYSTOLIC BLOOD PRESSURE: 116 MMHG | HEIGHT: 64 IN | BODY MASS INDEX: 30.71 KG/M2 | TEMPERATURE: 97.6 F | HEART RATE: 68 BPM | RESPIRATION RATE: 16 BRPM | WEIGHT: 179.9 LBS | OXYGEN SATURATION: 97 %

## 2019-05-21 LAB
ANION GAP SERPL CALCULATED.3IONS-SCNC: 17.9 MMOL/L
BH CV ECHO MEAS - DIST REN A EDV LEFT: 26.5 CM/SEC
BH CV ECHO MEAS - DIST REN A PSV LEFT: 87.5 CM/SEC
BH CV ECHO MEAS - DIST REN A RI LEFT: 0.7
BH CV ECHO MEAS - MID REN A EDV LEFT: 19.2 CM/SEC
BH CV ECHO MEAS - MID REN A PSV LEFT: 64.2 CM/SEC
BH CV ECHO MEAS - MID REN A RI LEFT: 0.7
BH CV ECHO MEAS - PROX REN A EDV LEFT: 22.6 CM/SEC
BH CV ECHO MEAS - PROX REN A PSV LEFT: 100 CM/SEC
BH CV ECHO MEAS - PROX REN A RI LEFT: 0.77
BH CV VAS BP LEFT ARM: NORMAL MMHG
BH CV VAS BP RIGHT ARM: NORMAL MMHG
BH CV VAS RENAL AORTIC MID EDV: 18 CM/S
BH CV VAS RENAL AORTIC MID PSV: 80 CM/S
BH CV VAS RENAL HILUM LEFT EDV: 17 CM/S
BH CV VAS RENAL HILUM LEFT PSV: 57 CM/S
BH CV VAS RENAL HILUM RIGHT EDV: 16 CM/S
BH CV VAS RENAL HILUM RIGHT PSV: 51.2 CM/S
BH CV XLRA MEAS - KID L LEFT: 10.4 CM
BH CV XLRA MEAS - RENAL A ORG RI LEFT: 0.72
BH CV XLRA MEAS DIST REN A EDV RIGHT: 23.6 CM/SEC
BH CV XLRA MEAS DIST REN A PSV RIGHT: 83.7 CM/SEC
BH CV XLRA MEAS DIST REN A RI RIGHT: 0.72
BH CV XLRA MEAS KID L RIGHT: 10.4 CM
BH CV XLRA MEAS KID W RIGHT: 4.4 CM
BH CV XLRA MEAS MID REN A EDV RIGHT: 33 CM/SEC
BH CV XLRA MEAS MID REN A PSV RIGHT: 141 CM/SEC
BH CV XLRA MEAS MID REN A RI RIGHT: 0.77
BH CV XLRA MEAS PROX REN A EDV RIGHT: 28.6 CM/SEC
BH CV XLRA MEAS PROX REN A PSV RIGHT: 94.8 CM/SEC
BH CV XLRA MEAS PROX REN A RI RIGHT: 0.7
BH CV XLRA MEAS RAR LEFT: 1.3
BH CV XLRA MEAS RAR RIGHT: 1.8
BH CV XLRA MEAS RENAL A ORG EDV LEFT: 18.3 CM/SEC
BH CV XLRA MEAS RENAL A ORG EDV RIGHT: 21.6 CM/SEC
BH CV XLRA MEAS RENAL A ORG PSV LEFT: 66 CM/SEC
BH CV XLRA MEAS RENAL A ORG PSV RIGHT: 84.5 CM/SEC
BH CV XLRA MEAS RENAL A ORG RI RIGHT: 0.74
BUN BLD-MCNC: 7 MG/DL (ref 8–23)
BUN/CREAT SERPL: 9 (ref 7–25)
CALCIUM SPEC-SCNC: 9.9 MG/DL (ref 8.6–10.5)
CHLORIDE SERPL-SCNC: 98 MMOL/L (ref 98–107)
CO2 SERPL-SCNC: 25.1 MMOL/L (ref 22–29)
CREAT BLD-MCNC: 0.78 MG/DL (ref 0.57–1)
GFR SERPL CREATININE-BSD FRML MDRD: 73 ML/MIN/1.73
GLUCOSE BLD-MCNC: 107 MG/DL (ref 65–99)
LEFT KIDNEY WIDTH: 4.8 CM
LEFT RENAL UPPER PARENCHYMA MAX: 37.2 CM/S
LEFT RENAL UPPER PARENCHYMA MIN: 11.4 CM/S
LEFT RENAL UPPER PARENCHYMA RI: 0.69
POTASSIUM BLD-SCNC: 3.6 MMOL/L (ref 3.5–5.2)
RIGHT RENAL UPPER PARENCHYMA MAX: 29.2 CM/S
RIGHT RENAL UPPER PARENCHYMA MIN: 9.4 CM/S
RIGHT RENAL UPPER PARENCHYMA RI: 0.68
SODIUM BLD-SCNC: 141 MMOL/L (ref 136–145)
TSH SERPL DL<=0.05 MIU/L-ACNC: 0.35 MIU/ML (ref 0.27–4.2)

## 2019-05-21 PROCEDURE — 93975 VASCULAR STUDY: CPT

## 2019-05-21 PROCEDURE — 63710000001 PROMETHAZINE PER 25 MG: Performed by: ORTHOPAEDIC SURGERY

## 2019-05-21 PROCEDURE — 82088 ASSAY OF ALDOSTERONE: CPT | Performed by: INTERNAL MEDICINE

## 2019-05-21 PROCEDURE — 84244 ASSAY OF RENIN: CPT | Performed by: INTERNAL MEDICINE

## 2019-05-21 PROCEDURE — 97161 PT EVAL LOW COMPLEX 20 MIN: CPT

## 2019-05-21 PROCEDURE — 84443 ASSAY THYROID STIM HORMONE: CPT | Performed by: INTERNAL MEDICINE

## 2019-05-21 PROCEDURE — 25010000003 CEFAZOLIN IN DEXTROSE 2-4 GM/100ML-% SOLUTION: Performed by: ORTHOPAEDIC SURGERY

## 2019-05-21 PROCEDURE — 80048 BASIC METABOLIC PNL TOTAL CA: CPT | Performed by: INTERNAL MEDICINE

## 2019-05-21 RX ADMIN — OXYCODONE HYDROCHLORIDE AND ACETAMINOPHEN 1 TABLET: 7.5; 325 TABLET ORAL at 09:40

## 2019-05-21 RX ADMIN — CEFAZOLIN SODIUM 2 G: 2 INJECTION, SOLUTION INTRAVENOUS at 01:36

## 2019-05-21 RX ADMIN — LISINOPRIL: 10 TABLET ORAL at 09:40

## 2019-05-21 RX ADMIN — ASPIRIN 325 MG: 325 TABLET ORAL at 09:40

## 2019-05-21 RX ADMIN — PROMETHAZINE HYDROCHLORIDE 25 MG: 25 TABLET ORAL at 12:51

## 2019-05-21 RX ADMIN — METOPROLOL TARTRATE 100 MG: 100 TABLET, FILM COATED ORAL at 09:40

## 2019-05-21 NOTE — NURSING NOTE
Spoke with Roque in lab and she said the urine was placed in containers to be sent to [facility] for catacholamines and metanephrines

## 2019-05-21 NOTE — PLAN OF CARE
Problem: Patient Care Overview  Goal: Plan of Care Review  Outcome: Ongoing (interventions implemented as appropriate)   05/21/19 4854   Coping/Psychosocial   Plan of Care Reviewed With patient   Plan of Care Review   Progress improving   OTHER   Outcome Summary VSS. HA controlled with PO pain med. Splint to RLE c/d/i. Remains NWB. Up to chair and worked with PT today. Voiding per BSC. Discussed bp med and monitoring r/t HTN. Discharging home with assist from spouse.        Problem: Fall Risk (Adult)  Goal: Absence of Fall  Outcome: Outcome(s) achieved Date Met: 05/21/19      Problem: Surgery Nonspecified (Adult)  Goal: Signs and Symptoms of Listed Potential Problems Will be Absent, Minimized or Managed (Surgery Nonspecified)  Outcome: Ongoing (interventions implemented as appropriate)    Goal: Anesthesia/Sedation Recovery  Outcome: Ongoing (interventions implemented as appropriate)

## 2019-05-21 NOTE — PROGRESS NOTES
Continued Stay Note  [facility]     Patient Name: Loan Pena  MRN: [id number]  Today's Date: 5/21/2019    Admit Date: 5/20/2019    Discharge Plan     Row Name 05/21/19 1302       Plan    Final Discharge Disposition Code  01 - home or self-care    Final Note  Pt to d/c home with family support.        Discharge Codes    No documentation.       Expected Discharge Date and Time     Expected Discharge Date Expected Discharge Time    May 21, 2019             Lashonda Muniz RN

## 2019-05-21 NOTE — PLAN OF CARE
"Problem: Patient Care Overview  Goal: Plan of Care Review   05/21/19 6389   Coping/Psychosocial   Plan of Care Reviewed With patient;spouse   OTHER   Outcome Summary pt presents w generalized weakness, pain controlled POD 1 RLE sx complicated by recent \" L ankle sprain\" ot uses walking boot for. good tolerance to tsf OOB to chair w short distance \"hoping\" pt displays safe adherence to NWB RLE. safely navigated 1 step (recommend WC at top of 2nd step for safe tranfering into home). pt and spouse receptive to PT recommendations. pt has all equipment needs (rwx, WC, knee scooter, and electric scooter). plans to DC home today.          "

## 2019-05-21 NOTE — PROGRESS NOTES
Orthopedic Progress Note      Patient: Loan Pena    Date of Admission: 5/20/2019  5:21 AM  YOB: 1950  Medical Record Number: [id number]    Attending Physician: Awais Ledezma MD  Consulting Physician: Awais Ledezma MD    Post-Operative Day: 1    Procedure(s):  RIGHT PERONEAL TENDON TRANSFER RIGHT TENDONACHILLES LENGTHENING  MULITPLE RIGHT MID FOOT ARTHRODESIS WITH BONE GRAFT REALIGNMENT OSTEOTMIES    Subjective :Looks well; Has a headache; BP now 132/84    Systemic or Specific Complaints: No Complaints    Pain Relief: complete resolution    Activity: out of bed and ambulate    Weight Bearing: NWB    Objective :    Vital signs in last 24 hours:  Temp:  [97.2 °F (36.2 °C)-98.7 °F (37.1 °C)] 97.7 °F (36.5 °C)  Heart Rate:  [61-90] 72  Resp:  [16-18] 18  BP: (111-176)/() 134/82  Vitals:    05/20/19 1912 05/20/19 2309 05/21/19 0304 05/21/19 0720   BP: 134/87 139/80 133/82 134/82   BP Location: Right arm Right arm Right arm Right arm   Patient Position: Lying Lying Lying Lying   Pulse: 90 66 71 72   Resp: 16 16 16 18   Temp: 97.5 °F (36.4 °C) 97.9 °F (36.6 °C) 98.7 °F (37.1 °C) 97.7 °F (36.5 °C)   TempSrc: Oral Oral Oral Oral   SpO2: 98% 94% 96% 97%   Weight:       Height:           Physical Examination:   General: Patient alert and oriented x 4 spheres.   Dressing: Clean, dry and intact  Position:  Alignment excellent   Extremity: Calf soft, non-tender with distal circulation intact. Block effective.Circulation excellent    Diagnostic Test Review:        Results from last 7 days   Lab Units 05/21/19  0446   SODIUM mmol/L 141   POTASSIUM mmol/L 3.6   CHLORIDE mmol/L 98   CO2 mmol/L 25.1   BUN mg/dL 7*   CREATININE mg/dL 0.78   GLUCOSE mg/dL 107*   CALCIUM mg/dL 9.9         No results found for: CRYSTAL]  No results found for: CULTURE]  No results found for: URICACID]    No results found.    Assessment/Plan :  Discharge Plan Home on today  Dr Borjas help appreciated  Surgery explained;  Instructions goven; questions answered  Undergoing further HTN workup before D/C home today  ASA daily      Awais Ledezma MD      Date: 5/21/2019   Time: 7:35 AM    EMR Dragon/Transcription disclaimer:   Much of this encounter note is an electronic transcription/translation of spoken language to printed text. The electronic translation of spoken language may permit erroneous, or at times, nonsensical words or phrases to be inadvertently transcribed; Although I have reviewed the note for such errors, some may still exist.

## 2019-05-21 NOTE — THERAPY DISCHARGE NOTE
Acute Care - Physical Therapy Initial Eval/Discharge  [facility]     Patient Name: Loan Pena  : 1950  MRN: [id number]  Today's Date: 2019   Onset of Illness/Injury or Date of Surgery: 19  Date of Referral to PT: 19  Referring Physician: Brisa      Admit Date: 2019    Visit Dx:    ICD-10-CM ICD-9-CM   1. Impaired mobility Z74.09 799.89     Patient Active Problem List   Diagnosis   • Osteoarthritis of ankle and foot, right   • Osteoarthritis     Past Medical History:   Diagnosis Date   • Arthritis     OSTEO   • History of peptic ulcer disease     TREATED PREVACID, AMOXICILLIN AND PEPTOBISMOL    • Hypertension      Past Surgical History:   Procedure Laterality Date   • BRANCHIAL CLEFT CYST EXCISION Right    • COLONOSCOPY     • ENDOSCOPY     • LASIK Bilateral    • SINUS SURGERY            PT ASSESSMENT (last 12 hours)      Physical Therapy Evaluation     Row Name 19 1135          PT Evaluation Time/Intention    Subjective Information  no complaints  -     Document Type  discharge evaluation/summary  -     Mode of Treatment  individual therapy;physical therapy  -     Patient Effort  excellent  -     Symptoms Noted During/After Treatment  none  -     Row Name 19 1135          General Information    Patient Profile Reviewed?  yes  -     Onset of Illness/Injury or Date of Surgery  19  -     Referring Physician  Brisa  -     Patient Observations  alert;cooperative;agree to therapy  -     Patient/Family Observations  pt supine in bed no acute distress  -     General Observations of Patient  SO bedside  -     Prior Level of Function  independent:  -     Equipment Currently Used at Home  -- rwx, WC, knee scooter, electric scooter  -     Pertinent History of Current Functional Problem  POD 1 RIGHT PERONEAL TENDON TRANSFER RIGHT TENDONACHILLES LENGTHENING MULITPLE RIGHT MIRIGHT PERONEAL TENDON TRANSFER RIGHT TENDONACHILLES LENGTHENING MULITPLE RIGHT  "MID FOOT ARTHRODESIS WITH BONE GRAFT REALIGNMENT OSTEOTMIES hx of recent L ankle \"sprain\"  -     Existing Precautions/Restrictions  fall;non-weight bearing;right  -LH     Risks Reviewed  patient and family:  -     Benefits Reviewed  patient and family:  -     Row Name 05/21/19 1135          Resource/Environmental Concerns    Resource/Environmental Concerns  home accessibility  -     Home Accessibility Concerns  stairs to enter home 2 no HR  -Critical access hospital Name 05/21/19 1135          Cognitive Assessment/Intervention- PT/OT    Orientation Status (Cognition)  oriented x 4  -     Follows Commands (Cognition)  WFL  -Critical access hospital Name 05/21/19 1135          Mobility Assessment/Treatment    Extremity Weight-bearing Status  right lower extremity  -     Right Lower Extremity (Weight-bearing Status)  non weight-bearing (NWB) LLE WBAT w walking boot per pt report  -Critical access hospital Name 05/21/19 1135          Bed Mobility Assessment/Treatment    Bed Mobility Assessment/Treatment  supine-sit  -     Supine-Sit [place] (Bed Mobility)  supervision  -Critical access hospital Name 05/21/19 1135          Transfer Assessment/Treatment    Transfer Assessment/Treatment  sit-stand transfer;stand-sit transfer  -     Sit-Stand [place] (Transfers)  contact guard  -     Stand-Sit [place] (Transfers)  contact guard  -Critical access hospital Name 05/21/19 1135          Sit-Stand Transfer    Assistive Device (Sit-Stand Transfers)  walker, front-wheeled  -Critical access hospital Name 05/21/19 1135          Stand-Sit Transfer    Assistive Device (Stand-Sit Transfers)  walker, front-wheeled  -Critical access hospital Name 05/21/19 1135          Gait/Stairs Assessment/Training    [place] Level (Gait)  contact guard  -     Assistive Device (Gait)  walker, front-wheeled  -     Distance in Feet (Gait)  5  -LH     Pattern (Gait)  -- \"hoping\"  -     Negotiation (Stairs)  stairs independence;stairs assistive device  -     [place] Level (Stairs)  contact guard;verbal " cues  -     Assistive Device (Stairs)  walker, front-wheeled  -     Handrail Location (Stairs)  none  -     Number of Steps (Stairs)  1 and simulated WC at top of 2nd step pt can back into home WC  -     Ascending Technique (Stairs)  step-to-step backwards  -     Comment (Gait/Stairs)  encouraged pt to minimize hop steps to allow LLE to heal - encouraged WC mobility  -     Row Name 05/21/19 1135          General ROM    GENERAL ROM COMMENTS  impaired BLE ankles  -     Row Name 05/21/19 1135          MMT (Manual Muscle Testing)    General MMT Comments  impaired BLE ankles  -     Row Name 05/21/19 1135          Pain Assessment    Additional Documentation  Pain Scale: FACES Pre/Post-Treatment (Group)  -     Row Name 05/21/19 1135          Pain Scale: Numbers Pre/Post-Treatment    Pain Location - Side  Right  -     Row Name 05/21/19 1135          Pain Scale: FACES Pre/Post-Treatment    Pain: FACES Scale, Pretreatment  0-->no hurt  -     Pain: FACES Scale, Post-Treatment  0-->no hurt  -     Row Name             Wound 05/20/19 0805 Right foot incision    Wound - Properties Group Date first assessed: 05/20/19  - Time first assessed: 0805  -BM Side: Right  -BM Location: foot  -BM Type: incision  -BM    Row Name 05/21/19 1135          Plan of Care Review    Plan of Care Reviewed With  patient;spouse  -     Row Name 05/21/19 1135          Physical Therapy Clinical Impression    Date of Referral to PT  05/21/19  -     Care Plan Review, Other Participant (PT Clinical Impression)  spouse  -     Row Name 05/21/19 1135          Positioning and Restraints    Pre-Treatment Position  in bed  -     Post Treatment Position  chair  -     In Chair  reclined;call light within reach;encouraged to call for assist;with family/caregiver  -       User Key  (r) = Recorded By, (t) = Taken By, (c) = Cosigned By    Initials Name Provider Type     Louisa Park, PT Physical Therapist    BM Mikaela Mcbride,  "RN Registered Nurse          Physical Therapy Education     Title: PT OT SLP Therapies (Resolved)     Topic: Physical Therapy (Resolved)     Point: Mobility training (Resolved)     Learning Progress Summary           Patient Acceptance, E,TB,D, VU,DU by  at 5/21/2019 11:47 AM   Family Acceptance, E,TB,D, VU,DU by  at 5/21/2019 11:47 AM                   Point: Precautions (Resolved)     Learning Progress Summary           Patient Acceptance, E,TB,D, VU,DU by  at 5/21/2019 11:47 AM   Family Acceptance, E,TB,D, VU,DU by  at 5/21/2019 11:47 AM                               User Key     Initials Effective Dates Name Provider Type [facility] 04/03/18 -  Louisa Park, PT Physical Therapist PT                PT Recommendation and Plan  Anticipated Discharge Disposition (PT): home with home health, home with assist  Therapy Frequency (PT Clinical Impression): evaluation only  Outcome Summary/Treatment Plan (PT)  Anticipated Discharge Disposition (PT): home with home health, home with assist  Plan of Care Reviewed With: patient, spouse  Outcome Summary: pt presents w generalized weakness, pain controlled POD 1 RLE sx complicated by recent \" L ankle sprain\" ot uses walking boot for. good tolerance to tsf OOB to chair w short distance \"hoping\" pt displays safe adherence to NWB RLE. safely navigated 1 step (recommend WC at top of 2nd step for safe tranfering into home). pt and spouse receptive to PT recommendations. pt has all equipment needs (rwx, WC, knee scooter, and electric scooter). plans to DC home today.          Time Calculation:   PT Charges     Row Name 05/21/19 1148             Time Calculation    Start Time  1118  -      Stop Time  1134  -      Time Calculation (min)  16 min  -      PT Received On  05/21/19  -        User Key  (r) = Recorded By, (t) = Taken By, (c) = Cosigned By    Initials Name Provider Type     Louisa Park, PT Physical Therapist        Therapy Charges for Today     Code " Description Service Date Service Provider Modifiers Qty    [id number] HC PT EVAL LOW COMPLEXITY 2 5/21/2019 Louisa Park, PT GP 1               PT Discharge Summary  Anticipated Discharge Disposition (PT): home with home health, home with assist    Louisa Park, PT  5/21/2019

## 2019-05-21 NOTE — PROGRESS NOTES
Orthopedic Discharge Summary      Patient: Loan Pena      YOB: 1950  Medical Record Number: [id number]    Attending Physician: Awais Ledezma MD  Consulting Physician(s):     Date of Admission: 5/20/2019  5:21 AM  Date of Discharge:     Admitting Diagnosis: * No pre-op diagnosis entered *  Procedures:  RIGHT PERONEAL TENDON TRANSFER RIGHT TENDONACHILLES LENGTHENING MULITPLE RIGHT MID FOOT ARTHRODESIS WITH BONE GRAFT REALIGNMENT OSTEOTMIES       No Known Allergies    Current Medications:     Discharge Medications      Continue These Medications      Instructions Start Date   CloNIDine 0.2 MG tablet  Commonly known as:  CATAPRES   0.2 mg, Oral, 2 Times Daily      cyclobenzaprine 10 MG tablet  Commonly known as:  FLEXERIL   10 mg, Oral, 3 Times Daily PRN      lisinopril-hydrochlorothiazide 10-12.5 MG per tablet  Commonly known as:  PRINZIDE,ZESTORETIC   1 tablet, Oral, 2 Times Daily      metoprolol tartrate 50 MG tablet  Commonly known as:  LOPRESSOR   100 mg, Oral, 2 Times Daily               Past Medical History:   Diagnosis Date   • Arthritis     OSTEO   • History of peptic ulcer disease     TREATED PREVACID, AMOXICILLIN AND PEPTOBISMOL    • Hypertension      Past Surgical History:   Procedure Laterality Date   • BRANCHIAL CLEFT CYST EXCISION Right    • COLONOSCOPY     • ENDOSCOPY     • LASIK Bilateral    • SINUS SURGERY       Social History     Occupational History   • Not on file   Tobacco Use   • Smoking status: Never Smoker   • Smokeless tobacco: Never Used   Substance and Sexual Activity   • Alcohol use: No     Frequency: Never   • Drug use: No   • Sexual activity: Defer      Social History     Social History Narrative   • Not on file     Family History   Problem Relation Age of Onset   • Malig Hyperthermia Neg Hx          Physical Exam: 69 y.o. female        Vitals:    05/20/19 1912 05/20/19 2309 05/21/19 0304 05/21/19 0720   BP: 134/87 139/80 133/82 134/82   BP Location: Right arm Right  arm Right arm Right arm   Patient Position: Lying Lying Lying Lying   Pulse: 90 66 71 72   Resp: 16 16 16 18   Temp: 97.5 °F (36.4 °C) 97.9 °F (36.6 °C) 98.7 °F (37.1 °C) 97.7 °F (36.5 °C)   TempSrc: Oral Oral Oral Oral   SpO2: 98% 94% 96% 97%   Weight:       Height:           General Appearance: Alert and oriented x 4 spheres, cooperative, in no acute distress  Dressing: Clean, dry and intact.  Operative Extremity: Calf supple, soft and non-tender. Neurovascular status remains intact without change from pre-op. Alignment/postion is excellent.    Hospital Course:  69 y.o. female admitted to [facility] to services of Awais Ledezma MD with <principal problem not specified>on 5/20/2019 and underwent RIGHT PERONEAL TENDON TRANSFER RIGHT TENDONACHILLES LENGTHENING MULITPLE RIGHT MID FOOT ARTHRODESIS WITH BONE GRAFT REALIGNMENT OSTEOTMIES Per Awais Ledezma MD. Antibiotic and VTE prophylaxis were per SCIP protocols. Post-operatively the patient transferred to the post-operative floor where the patient underwent mobilization therapy that included active as well as passive ROM exercises. Opioids were titrated to achieve appropriate pain management to allow for participation in mobilization exercises. Vital signs are now stable. Appropriate education re: incision care, activity levels, medications, and follow up visits was completed and all questions were answered. The patient is now deemed stable for discharge to Home. Surgery explained, instructions given, and questions answered.      DIAGNOSTIC TESTS:       Results from last 7 days   Lab Units 05/21/19  0446   SODIUM mmol/L 141   POTASSIUM mmol/L 3.6   CHLORIDE mmol/L 98   CO2 mmol/L 25.1   BUN mg/dL 7*   CREATININE mg/dL 0.78   GLUCOSE mg/dL 107*   CALCIUM mg/dL 9.9         No results found for: CRYSTAL]  No results found for: CULTURE]  No results found for: URICACID]       No results found.    Discharge and Follow up Instructions:       I. What you can  expect  1.  Swelling: This is to be expected.  It is difficult to specify what constitutes as an                abnormal amount of swelling.  You can minimize this by staying off your feet,        keeping the foot elevated and applying ice.  2.  Pain: Everyone experiences pain, unfortunately, some worse than others.  You will        be given a prescription for pain medication before you leave the hospital, by either        Dr. Ledezma or one of his assistants.  Please feel free to check with us if you are allergic        to any pain medication.  If you have has local anesthesia, start taking the pain        medication when the anesthesia begins to wear off.  3.  Bleeding: This always occurs.  You will notice slight oozing occasionally through the        bandages.  If bleeding continues and soaks through the dressing please call us.    II. What to do after surgery  1.  When you return home you must rest.  You may either sit in a chair or in bed, with        the foot elevated.  Th position of the foot should be above the level of the heart.        Propping the feet up on a few pillows always helps.  2.  Do not do any excessive or unnecessary walking during the first few days after        surgery.  May get up during the first 48 hours only to eat and use the bathroom.         Each operation is slightly different, and you may be specifically that no walking is        allowed at all for a period of time.  Under these circumstances, you will be best off        using a crutch or walker.  3.  If you are given a special shoe to use after surgery, you may not walk without it.  You       do not however, have to wear the shoe at night.  You will find it easiest commence       walking on your heel and put more weight on the flat foot over the course of the next       few days.  4. Use ice over the foot for about 24 hours after surgery.  The small commercially       available ice packs are useless.  Fill a large garbage bag with  ice and prop the bag       over the foot, not the ankle.  Alternatively, place the ice bag on the bed and turn on       your side with the foot lying directly on the ice pack.  5.  If you are taking pain medication, there are common side effects such as dizziness,       drowsiness, and nausea.  If these or an allergic reaction occur, stop taking the       medication and call us.  To prevent nausea, eat prior to taking the medication.    III. Do Not Do Any of the Following  1. Do not get the bandages wet.  When bathing, either sponge bathe or hang the foot        over the side of the bath.  The safest is to get into the empty tub with the shoe on,       elevate the foot, and then fill the tub.  Preferably, empty the tub prior to getting out.        Showering is possible with commercial plastic protectors.  2. Don't remove your dressing unless specifically instructed to do so.    *Please understand that the postoperative course varies from patient to patient, and these are only ment as guidelines to a smoother recovery.  These instructions do not cover all aspects of your post-operative care and recovery and if you have any questions which you feel are unasnswered by this instruction sheet, please call us.    · Please call us if you develop a fever associated with redness or drainage around the wound.  · Occasionally the bandages are too tight and may need to be changed before your scheduled appointment.  · We are interested in your prompt and healthy recovery.  Please cooperate with the above instructions.  · Please call the office for a follow-up appointment at [phone]                                                                               *IF YOU SMOKE OR USE TOBACCO PLEASE READ THE FOLLOWING INFORMATION.    Why is smoking bad for me?  · Smoking increases the risk of heart disease, lung disease, vascular disease, stroke and cancer.  ·   · If you smoke STOP!  · If you would like more information on quitting  smoking, please call [facility] [phone] or call American Cancer Society [phone] or American Heart Association [phone].   · Additional information is available through the [facility] website ([email or web address]) under Health Information, select Additional Topics then Smoking.    IV. Medications:  · Stool Softeners: You will be at greater risk of constipation after surgery due to being less mobile and the pain medications.   ? Take stool softeners as instructed by your surgeon while on pain medications. Over the counter Colace 100 mg 1-2 capsules twice daily.   ? If stools become too loose or too frequent, please decreases the dosage or stop the stool softener.  ? If constipation occurs despite use of stool softeners, you are to continue the stool softeners and add a laxative (Milk of Magnesia 1 ounce daily as needed)  ? Drink plenty of fluids, and eat fruits and vegetables during your recovery time    · Pain Medications utilized after surgery are narcotics and the law requires that the following information be given to all patients that are prescribed narcotics:  ? CLASSIFICATION: Pain medications are called Opioids and are narcotics  ? LEGALITIES: It is illegal to share narcotics with others and to drive within 24 hours of taking narcotics  ? POTENTIAL SIDE EFFECTS: Potential side effects of opioids include: nausea, vomiting, itching, dizziness, drowsiness, dry mouth, constipation, and difficulty urinating.  ? POTENTIAL ADVERSE EFFECTS:   o Opioid tolerance can develop with use of pain medications and this simply means that it requires more and more of the medication to control pain; however, this is seen more in patients that use opioids for longer periods of time.  o Opioid dependence can develop with use of Opioids and this simply means that to stop the medication can cause withdrawal symptoms; however, this is seen with patients that use Opioids  for longer periods of time.  o Opioid addiction can develop with use of Opioids and the incidence of this is very unlikely in patients who take the medications as ordered and stop the medications as instructed.  o Opioid overdose can be dangerous, but is unlikely when the medication is taken as ordered and stopped when ordered. It is important not to mix opioids with alcohol or with and type of sedative such as Benadryl as this can lead to over sedation and respiratory difficulty.  ? DOSAGE:   o Pain medications will need to be taken consistently for the first 1-2 days to decrease pain and promote adequate pain relief and participation in physical therapy.  o After the initial surgical pain begins to resolve, you may begin to decrease the pain medication. By the end of 1 week, you should be off of pain medications.  o Refills will not be given by the office during evening hours, on weekends, or after 2 weeks post-op.  o To seek refills on pain medications during the initial 2 week post-operative period, you must call the office 48 hours in advance to request the refill. The office will then notify you when to  the prescription. DO NOT wait until you are out of the medication to request a refill.    V. FOLLOW-UP VISITS:  · You will need to follow up in the office with your surgeon as 2 weeks. Please call this number [phone] to schedule this appointment.    · If you have any concerns or suspected complications prior to your follow up visit, please call your surgeons office. Do not wait until your appointment time if you suspect complications. These will need to be addressed in the office promptly. If you have further questions, ask your nurse or doctor.        Date: 5/21/2019    Awais Ledezma MD    EMR Dragon/Transcription disclaimer:   Much of this encounter note is an electronic transcription/translation of spoken language to printed text. The electronic translation of spoken language may permit  erroneous, or at times, nonsensical words or phrases to be inadvertently transcribed; Although I have reviewed the note for such errors, some may still exist.

## 2019-05-21 NOTE — PROGRESS NOTES
Name: Loan Pena ADMIT: 2019   : 1950  PCP: Spencer Pena MD    MRN: [id number] LOS: 0 days   AGE/SEX: 69 y.o. female  ROOM: [facility]   Subjective   No CP SOA NVD.    Objective   Vital Signs  Temp:  [97.5 °F (36.4 °C)-98.7 °F (37.1 °C)] 97.6 °F (36.4 °C)  Heart Rate:  [66-90] 68  Resp:  [16-18] 16  BP: (116-155)/(76-92) 116/76  SpO2:  [94 %-98 %] 97 %  on  Flow (L/min):  [2] 2;   Device (Oxygen Therapy): room air  Body mass index is 31.09 kg/m².    Physical Exam   Constitutional: She is oriented to person, place, and time. She appears well-developed. No distress.   HENT:   Head: Atraumatic.   Nose: Nose normal.   Eyes: Conjunctivae and EOM are normal.   Neck: Normal range of motion. Neck supple.   Cardiovascular: Normal rate and regular rhythm.   Pulmonary/Chest: Effort normal and breath sounds normal.   Abdominal: Soft. She exhibits no distension.   Musculoskeletal:   RLE dressed   Neurological: She is alert and oriented to person, place, and time.   Skin: Skin is warm and dry. She is not diaphoretic.   Psychiatric: She has a normal mood and affect. Her behavior is normal.   Nursing note and vitals reviewed.      Results Review:       I reviewed the patient's new clinical results.     I reviewed imaging, agree with interpretation.          Results from last 7 days   Lab Units 19  0446   SODIUM mmol/L 141   POTASSIUM mmol/L 3.6   CHLORIDE mmol/L 98   CO2 mmol/L 25.1   BUN mg/dL 7*   CREATININE mg/dL 0.78   GLUCOSE mg/dL 107*   Estimated Creatinine Clearance: 69.3 mL/min (by C-G formula based on SCr of 0.78 mg/dL).  Results from last 7 days   Lab Units 19  0446   CALCIUM mg/dL 9.9         aspirin 325 mg Oral Daily   ceFAZolin 2 g Intravenous Q8H   CloNIDine 0.2 mg Oral BID   lisinopril-hydroCHLOROthiazide (ZESTORETIC) 10-12.5 mg combo dose  Oral Q12H   metoprolol tartrate 100 mg Oral BID       lactated ringers 9 mL/hr Last Rate: Stopped (19 3740)   Diet  Regular      Assessment/Plan      Active Hospital Problems    Diagnosis  POA   • Osteoarthritis of ankle and foot, right [M19.071]  Yes   • Osteoarthritis [M19.90]  Yes      Resolved Hospital Problems   No resolved problems to display.     - HTN: Renal and thyroid function normal. Renal duplex without stenosis. Pheo and Renin studies pending and can be followed up as outpatient. BP improved today. Can discharge on home regimen and encouraged her to establish care with primary care md to further manage her hypertension.  - HA: Improved  - RLE OA    No objection to discharge. Please call with any questions or concerns.    Mitesh Reddy MD  [facility]  05/21/19  12:23 PM

## 2019-05-24 LAB
METANEPH/CREAT UR: 1.4 UG/MG CREAT (ref 0–1)
METANEPHS UR-MCNC: 45 UG/L
NORMETANEPHRINE 24H UR-MCNC: 185 UG/L

## 2019-05-25 LAB
CREAT 24H UR-MCNC: 17.3 MG/DL
DOPAMINE UR-MCNC: 39 UG/L
DOPAMINE/CREAT UR: 225 UG/G CREAT (ref 0–348)
EPINEPH UR-MCNC: 1 UG/L
EPINEPH/CREAT UR: 6 UG/G CREAT (ref 0–19)
NOREPINEPH UR-MCNC: 31 UG/L
NOREPINEPHRINE UG/G CREAT: 179 UG/G CREAT (ref 0–111)

## 2019-05-26 LAB
ALDOST SERPL-MCNC: 6.2 NG/DL (ref 0–30)
ALDOST/RENIN PLAS-RTO: 32.1 {RATIO} (ref 0–30)
RENIN PLAS-CCNC: 0.19 NG/ML/HR (ref 0.17–5.38)

## 2019-12-02 ENCOUNTER — APPOINTMENT (OUTPATIENT)
Dept: PREADMISSION TESTING | Facility: HOSPITAL | Age: 69
End: 2019-12-02

## 2019-12-02 VITALS
OXYGEN SATURATION: 99 % | HEART RATE: 66 BPM | HEIGHT: 64 IN | DIASTOLIC BLOOD PRESSURE: 92 MMHG | RESPIRATION RATE: 16 BRPM | WEIGHT: 181.2 LBS | SYSTOLIC BLOOD PRESSURE: 155 MMHG | TEMPERATURE: 97.8 F | BODY MASS INDEX: 30.93 KG/M2

## 2019-12-02 LAB
ALBUMIN SERPL-MCNC: 4.2 G/DL (ref 3.5–5.2)
ALBUMIN/GLOB SERPL: 1.3 G/DL
ALP SERPL-CCNC: 97 U/L (ref 39–117)
ALT SERPL W P-5'-P-CCNC: 10 U/L (ref 1–33)
ANION GAP SERPL CALCULATED.3IONS-SCNC: 10.3 MMOL/L (ref 5–15)
AST SERPL-CCNC: 16 U/L (ref 1–32)
BASOPHILS # BLD AUTO: 0.07 10*3/MM3 (ref 0–0.2)
BASOPHILS NFR BLD AUTO: 1 % (ref 0–1.5)
BILIRUB SERPL-MCNC: 0.7 MG/DL (ref 0.2–1.2)
BUN BLD-MCNC: 10 MG/DL (ref 8–23)
BUN/CREAT SERPL: 13.5 (ref 7–25)
CALCIUM SPEC-SCNC: 9.4 MG/DL (ref 8.6–10.5)
CHLORIDE SERPL-SCNC: 102 MMOL/L (ref 98–107)
CO2 SERPL-SCNC: 27.7 MMOL/L (ref 22–29)
CREAT BLD-MCNC: 0.74 MG/DL (ref 0.57–1)
DEPRECATED RDW RBC AUTO: 43.5 FL (ref 37–54)
EOSINOPHIL # BLD AUTO: 0.2 10*3/MM3 (ref 0–0.4)
EOSINOPHIL NFR BLD AUTO: 2.8 % (ref 0.3–6.2)
ERYTHROCYTE [DISTWIDTH] IN BLOOD BY AUTOMATED COUNT: 12.9 % (ref 12.3–15.4)
GFR SERPL CREATININE-BSD FRML MDRD: 78 ML/MIN/1.73
GLOBULIN UR ELPH-MCNC: 3.2 GM/DL
GLUCOSE BLD-MCNC: 106 MG/DL (ref 65–99)
HCT VFR BLD AUTO: 41.6 % (ref 34–46.6)
HGB BLD-MCNC: 13.5 G/DL (ref 12–15.9)
IMM GRANULOCYTES # BLD AUTO: 0.03 10*3/MM3 (ref 0–0.05)
IMM GRANULOCYTES NFR BLD AUTO: 0.4 % (ref 0–0.5)
LYMPHOCYTES # BLD AUTO: 2.7 10*3/MM3 (ref 0.7–3.1)
LYMPHOCYTES NFR BLD AUTO: 38.3 % (ref 19.6–45.3)
MCH RBC QN AUTO: 30.1 PG (ref 26.6–33)
MCHC RBC AUTO-ENTMCNC: 32.5 G/DL (ref 31.5–35.7)
MCV RBC AUTO: 92.9 FL (ref 79–97)
MONOCYTES # BLD AUTO: 0.73 10*3/MM3 (ref 0.1–0.9)
MONOCYTES NFR BLD AUTO: 10.4 % (ref 5–12)
NEUTROPHILS # BLD AUTO: 3.32 10*3/MM3 (ref 1.7–7)
NEUTROPHILS NFR BLD AUTO: 47.1 % (ref 42.7–76)
NRBC BLD AUTO-RTO: 0 /100 WBC (ref 0–0.2)
PLATELET # BLD AUTO: 414 10*3/MM3 (ref 140–450)
PMV BLD AUTO: 9.1 FL (ref 6–12)
POTASSIUM BLD-SCNC: 4.6 MMOL/L (ref 3.5–5.2)
PROT SERPL-MCNC: 7.4 G/DL (ref 6–8.5)
RBC # BLD AUTO: 4.48 10*6/MM3 (ref 3.77–5.28)
SODIUM BLD-SCNC: 140 MMOL/L (ref 136–145)
WBC NRBC COR # BLD: 7.05 10*3/MM3 (ref 3.4–10.8)

## 2019-12-02 PROCEDURE — 93005 ELECTROCARDIOGRAM TRACING: CPT

## 2019-12-02 PROCEDURE — 85025 COMPLETE CBC W/AUTO DIFF WBC: CPT | Performed by: ORTHOPAEDIC SURGERY

## 2019-12-02 PROCEDURE — 80053 COMPREHEN METABOLIC PANEL: CPT | Performed by: ORTHOPAEDIC SURGERY

## 2019-12-02 PROCEDURE — 36415 COLL VENOUS BLD VENIPUNCTURE: CPT

## 2019-12-02 PROCEDURE — 93010 ELECTROCARDIOGRAM REPORT: CPT | Performed by: INTERNAL MEDICINE

## 2019-12-02 RX ORDER — METOPROLOL TARTRATE 100 MG/1
100 TABLET ORAL 2 TIMES DAILY
COMMUNITY

## 2019-12-02 NOTE — DISCHARGE INSTRUCTIONS
Take the following medications the morning of surgery with a small sip of water: METOPROLOL      General Instructions:  • Do not eat solid food after midnight the night before surgery.  • You may drink clear liquids day of surgery but must stop at least one hour before your hospital arrival time.  • It is beneficial for you to have a clear drink that contains carbohydrates the day of surgery.  We suggest a 12 to 20 ounce bottle of Gatorade or Powerade for non-diabetic patients or a 12 to 20 ounce bottle of G2 or Powerade Zero for diabetic patients.    Clear liquids are liquids you can see through.  Nothing red in color.     Plain water                               Sports drinks  Sodas                                   Gelatin (Jell-O)  Fruit juices without pulp such as white grape juice and apple juice  Popsicles that contain no fruit or yogurt  Tea or coffee (no cream or milk added)  Gatorade / Powerade  G2 / Powerade Zero    • Bring any papers given to you in the doctor’s office.  • Wear clean comfortable clothes.  • Do not wear contact lenses, false eyelashes or make-up.  Bring a case for your glasses.   • Bring crutches or walker if applicable.  • Remove all piercings.  Leave jewelry and any other valuables at home.  • The Pre-Admission Testing nurse will instruct you to bring medications if unable to obtain an accurate list in Pre-Admission Testing.            Preventing a Surgical Site Infection:  • For 2 to 3 days before surgery, avoid shaving with a razor because the razor can irritate skin and make it easier to develop an infection.    • Any areas of open skin can increase the risk of a post-operative wound infection by allowing bacteria to enter and travel throughout the body.  Notify your surgeon if you have any skin wounds / rashes even if it is not near the expected surgical site.  The area will need assessed to determine if surgery should be delayed until it is healed.  • The night prior to surgery  sleep in a clean bed with clean clothing.  Do not allow pets to sleep with you.  • Shower on the morning of surgery using a fresh bar of anti-bacterial soap (such as Dial) and clean washcloth.  Dry with a clean towel and dress in clean clothing.  • Ask your surgeon if you will be receiving antibiotics prior to surgery.  • Make sure you, your family, and all healthcare providers clean their hands with soap and water or an alcohol based hand  before caring for you or your wound.    Day of surgery: 12/16/2019. [facility]. ARRIVAL TIME 915AM  Your arrival time is approximately two hours before your scheduled surgery time.  Upon arrival, a Pre-op nurse and Anesthesiologist will review your health history, obtain vital signs, and answer questions you may have.  The only belongings needed at this time will be a list of your home medications and if applicable your C-PAP/BI-PAP machine.  If you are staying overnight your family can leave the rest of your belongings in the car and bring them to your room later.  A Pre-op nurse will start an IV and you may receive medication in preparation for surgery, including something to help you relax.  Your family will be able to see you in the Pre-op area.  Two visitors at a time will be allowed in the Pre-op room.  While you are in surgery your family should notify the waiting room  if they leave the waiting room area and provide a contact phone number.    Please be aware that surgery does come with discomfort.  We want to make every effort to control your discomfort so please discuss any uncontrolled symptoms with your nurse.   Your doctor will most likely have prescribed pain medications.      If you are going home after surgery you will receive individualized written care instructions before being discharged.  A responsible adult must drive you to and from the hospital on the day of your surgery and stay with you for 24 hours.        You have received a list of  surgical assistants for your reference.  If you have any questions please call Pre-Admission Testing at [phone].  Deductibles and co-payments are collected on the day of service. Please be prepared to pay the required co-pay, deductible or deposit on the day of service as defined by your plan.

## 2019-12-16 ENCOUNTER — ANESTHESIA (OUTPATIENT)
Dept: PERIOP | Facility: HOSPITAL | Age: 69
End: 2019-12-16

## 2019-12-16 ENCOUNTER — HOSPITAL ENCOUNTER (OUTPATIENT)
Facility: HOSPITAL | Age: 69
Setting detail: HOSPITAL OUTPATIENT SURGERY
Discharge: HOME | End: 2019-12-16
Attending: ORTHOPAEDIC SURGERY | Admitting: ORTHOPAEDIC SURGERY

## 2019-12-16 ENCOUNTER — ANESTHESIA EVENT (OUTPATIENT)
Dept: PERIOP | Facility: HOSPITAL | Age: 69
End: 2019-12-16

## 2019-12-16 ENCOUNTER — APPOINTMENT (OUTPATIENT)
Dept: GENERAL RADIOLOGY | Facility: HOSPITAL | Age: 69
End: 2019-12-16

## 2019-12-16 VITALS
OXYGEN SATURATION: 93 % | DIASTOLIC BLOOD PRESSURE: 86 MMHG | SYSTOLIC BLOOD PRESSURE: 149 MMHG | TEMPERATURE: 97 F | RESPIRATION RATE: 20 BRPM | HEART RATE: 73 BPM

## 2019-12-16 PROCEDURE — 25010000002 ONDANSETRON PER 1 MG: Performed by: ANESTHESIOLOGY

## 2019-12-16 PROCEDURE — 63710000001 PROMETHAZINE PER 25 MG: Performed by: ANESTHESIOLOGY

## 2019-12-16 PROCEDURE — 73620 X-RAY EXAM OF FOOT: CPT

## 2019-12-16 PROCEDURE — C1713 ANCHOR/SCREW BN/BN,TIS/BN: HCPCS | Performed by: ORTHOPAEDIC SURGERY

## 2019-12-16 PROCEDURE — 76000 FLUOROSCOPY <1 HR PHYS/QHP: CPT

## 2019-12-16 PROCEDURE — C1769 GUIDE WIRE: HCPCS | Performed by: ORTHOPAEDIC SURGERY

## 2019-12-16 PROCEDURE — 25010000002 MIDAZOLAM PER 1 MG: Performed by: ANESTHESIOLOGY

## 2019-12-16 PROCEDURE — 25010000003 CEFAZOLIN IN DEXTROSE 2-4 GM/100ML-% SOLUTION: Performed by: ORTHOPAEDIC SURGERY

## 2019-12-16 PROCEDURE — 25010000002 DEXAMETHASONE PER 1 MG: Performed by: NURSE ANESTHETIST, CERTIFIED REGISTERED

## 2019-12-16 PROCEDURE — 25010000003 BUPIVACAINE LIPOSOME 1.3 % SUSPENSION: Performed by: ANESTHESIOLOGY

## 2019-12-16 PROCEDURE — C9290 INJ, BUPIVACAINE LIPOSOME: HCPCS | Performed by: ANESTHESIOLOGY

## 2019-12-16 PROCEDURE — 25010000002 ONDANSETRON PER 1 MG: Performed by: NURSE ANESTHETIST, CERTIFIED REGISTERED

## 2019-12-16 PROCEDURE — 25010000002 FENTANYL CITRATE (PF) 100 MCG/2ML SOLUTION: Performed by: ANESTHESIOLOGY

## 2019-12-16 PROCEDURE — 25010000002 PROPOFOL 10 MG/ML EMULSION: Performed by: NURSE ANESTHETIST, CERTIFIED REGISTERED

## 2019-12-16 DEVICE — IMPLANTABLE DEVICE: Type: IMPLANTABLE DEVICE | Site: ANKLE | Status: FUNCTIONAL

## 2019-12-16 DEVICE — SCRW MAXLOCK EXTRM NL 4X16MM: Type: IMPLANTABLE DEVICE | Site: ANKLE | Status: FUNCTIONAL

## 2019-12-16 RX ORDER — SODIUM CHLORIDE 0.9 % (FLUSH) 0.9 %
3-10 SYRINGE (ML) INJECTION AS NEEDED
Status: DISCONTINUED | OUTPATIENT
Start: 2019-12-16 | End: 2019-12-16 | Stop reason: HOSPADM

## 2019-12-16 RX ORDER — ENALAPRILAT 2.5 MG/2ML
0.62 INJECTION INTRAVENOUS ONCE AS NEEDED
Status: DISCONTINUED | OUTPATIENT
Start: 2019-12-16 | End: 2019-12-16 | Stop reason: HOSPADM

## 2019-12-16 RX ORDER — BUPIVACAINE HYDROCHLORIDE 2.5 MG/ML
INJECTION, SOLUTION EPIDURAL; INFILTRATION; INTRACAUDAL
Status: COMPLETED | OUTPATIENT
Start: 2019-12-16 | End: 2019-12-16

## 2019-12-16 RX ORDER — DEXAMETHASONE SODIUM PHOSPHATE 10 MG/ML
INJECTION INTRAMUSCULAR; INTRAVENOUS AS NEEDED
Status: DISCONTINUED | OUTPATIENT
Start: 2019-12-16 | End: 2019-12-16 | Stop reason: SURG

## 2019-12-16 RX ORDER — PROMETHAZINE HYDROCHLORIDE 25 MG/1
25 TABLET ORAL ONCE AS NEEDED
Status: COMPLETED | OUTPATIENT
Start: 2019-12-16 | End: 2019-12-16

## 2019-12-16 RX ORDER — FENTANYL CITRATE 50 UG/ML
50 INJECTION, SOLUTION INTRAMUSCULAR; INTRAVENOUS
Status: DISCONTINUED | OUTPATIENT
Start: 2019-12-16 | End: 2019-12-16 | Stop reason: HOSPADM

## 2019-12-16 RX ORDER — MIDAZOLAM HYDROCHLORIDE 1 MG/ML
1 INJECTION INTRAMUSCULAR; INTRAVENOUS
Status: DISCONTINUED | OUTPATIENT
Start: 2019-12-16 | End: 2019-12-16 | Stop reason: HOSPADM

## 2019-12-16 RX ORDER — BUPIVACAINE HYDROCHLORIDE 2.5 MG/ML
30 INJECTION, SOLUTION INFILTRATION; PERINEURAL ONCE
Status: DISCONTINUED | OUTPATIENT
Start: 2019-12-16 | End: 2019-12-16 | Stop reason: HOSPADM

## 2019-12-16 RX ORDER — ONDANSETRON 2 MG/ML
4 INJECTION INTRAMUSCULAR; INTRAVENOUS ONCE AS NEEDED
Status: COMPLETED | OUTPATIENT
Start: 2019-12-16 | End: 2019-12-16

## 2019-12-16 RX ORDER — PROPOFOL 10 MG/ML
VIAL (ML) INTRAVENOUS AS NEEDED
Status: DISCONTINUED | OUTPATIENT
Start: 2019-12-16 | End: 2019-12-16 | Stop reason: SURG

## 2019-12-16 RX ORDER — SODIUM CHLORIDE 0.9 % (FLUSH) 0.9 %
3 SYRINGE (ML) INJECTION EVERY 12 HOURS SCHEDULED
Status: DISCONTINUED | OUTPATIENT
Start: 2019-12-16 | End: 2019-12-16 | Stop reason: HOSPADM

## 2019-12-16 RX ORDER — PROMETHAZINE HYDROCHLORIDE 25 MG/ML
6.25 INJECTION, SOLUTION INTRAMUSCULAR; INTRAVENOUS ONCE AS NEEDED
Status: COMPLETED | OUTPATIENT
Start: 2019-12-16 | End: 2019-12-16

## 2019-12-16 RX ORDER — SCOLOPAMINE TRANSDERMAL SYSTEM 1 MG/1
1 PATCH, EXTENDED RELEASE TRANSDERMAL CONTINUOUS
Status: DISCONTINUED | OUTPATIENT
Start: 2019-12-16 | End: 2019-12-16 | Stop reason: HOSPADM

## 2019-12-16 RX ORDER — ONDANSETRON 2 MG/ML
INJECTION INTRAMUSCULAR; INTRAVENOUS AS NEEDED
Status: DISCONTINUED | OUTPATIENT
Start: 2019-12-16 | End: 2019-12-16 | Stop reason: SURG

## 2019-12-16 RX ORDER — FAMOTIDINE 10 MG/ML
20 INJECTION, SOLUTION INTRAVENOUS ONCE
Status: COMPLETED | OUTPATIENT
Start: 2019-12-16 | End: 2019-12-16

## 2019-12-16 RX ORDER — HYDROCODONE BITARTRATE AND ACETAMINOPHEN 7.5; 325 MG/1; MG/1
2 TABLET ORAL EVERY 4 HOURS PRN
Status: DISCONTINUED | OUTPATIENT
Start: 2019-12-16 | End: 2019-12-16 | Stop reason: HOSPADM

## 2019-12-16 RX ORDER — LIDOCAINE HYDROCHLORIDE 10 MG/ML
0.5 INJECTION, SOLUTION EPIDURAL; INFILTRATION; INTRACAUDAL; PERINEURAL ONCE AS NEEDED
Status: DISCONTINUED | OUTPATIENT
Start: 2019-12-16 | End: 2019-12-16 | Stop reason: HOSPADM

## 2019-12-16 RX ORDER — PROMETHAZINE HYDROCHLORIDE 25 MG/1
25 SUPPOSITORY RECTAL ONCE AS NEEDED
Status: COMPLETED | OUTPATIENT
Start: 2019-12-16 | End: 2019-12-16

## 2019-12-16 RX ORDER — LIDOCAINE HYDROCHLORIDE 20 MG/ML
INJECTION, SOLUTION INFILTRATION; PERINEURAL AS NEEDED
Status: DISCONTINUED | OUTPATIENT
Start: 2019-12-16 | End: 2019-12-16 | Stop reason: SURG

## 2019-12-16 RX ORDER — FENTANYL CITRATE 50 UG/ML
50 INJECTION, SOLUTION INTRAMUSCULAR; INTRAVENOUS
Status: COMPLETED | OUTPATIENT
Start: 2019-12-16 | End: 2019-12-16

## 2019-12-16 RX ORDER — SODIUM CHLORIDE, SODIUM LACTATE, POTASSIUM CHLORIDE, CALCIUM CHLORIDE 600; 310; 30; 20 MG/100ML; MG/100ML; MG/100ML; MG/100ML
9 INJECTION, SOLUTION INTRAVENOUS CONTINUOUS
Status: DISCONTINUED | OUTPATIENT
Start: 2019-12-16 | End: 2019-12-16 | Stop reason: HOSPADM

## 2019-12-16 RX ORDER — MIDAZOLAM HYDROCHLORIDE 1 MG/ML
2 INJECTION INTRAMUSCULAR; INTRAVENOUS
Status: DISCONTINUED | OUTPATIENT
Start: 2019-12-16 | End: 2019-12-16 | Stop reason: HOSPADM

## 2019-12-16 RX ORDER — HYDROMORPHONE HYDROCHLORIDE 1 MG/ML
0.25 INJECTION, SOLUTION INTRAMUSCULAR; INTRAVENOUS; SUBCUTANEOUS
Status: DISCONTINUED | OUTPATIENT
Start: 2019-12-16 | End: 2019-12-16 | Stop reason: HOSPADM

## 2019-12-16 RX ORDER — CEFAZOLIN SODIUM 2 G/100ML
2 INJECTION, SOLUTION INTRAVENOUS ONCE
Status: COMPLETED | OUTPATIENT
Start: 2019-12-16 | End: 2019-12-16

## 2019-12-16 RX ADMIN — BUPIVACAINE 8 ML: 13.3 INJECTION, SUSPENSION, LIPOSOMAL INFILTRATION at 10:36

## 2019-12-16 RX ADMIN — BUPIVACAINE HYDROCHLORIDE 22 ML: 2.5 INJECTION, SOLUTION EPIDURAL; INFILTRATION; INTRACAUDAL; PERINEURAL at 10:36

## 2019-12-16 RX ADMIN — SCOPALAMINE 1 PATCH: 1 PATCH, EXTENDED RELEASE TRANSDERMAL at 10:55

## 2019-12-16 RX ADMIN — DEXAMETHASONE SODIUM PHOSPHATE 4 MG: 10 INJECTION INTRAMUSCULAR; INTRAVENOUS at 13:33

## 2019-12-16 RX ADMIN — MIDAZOLAM 1 MG: 1 INJECTION INTRAMUSCULAR; INTRAVENOUS at 10:40

## 2019-12-16 RX ADMIN — MIDAZOLAM 1 MG: 1 INJECTION INTRAMUSCULAR; INTRAVENOUS at 10:35

## 2019-12-16 RX ADMIN — FENTANYL CITRATE 50 MCG: 50 INJECTION, SOLUTION INTRAMUSCULAR; INTRAVENOUS at 13:56

## 2019-12-16 RX ADMIN — FENTANYL CITRATE 50 MCG: 50 INJECTION, SOLUTION INTRAMUSCULAR; INTRAVENOUS at 10:35

## 2019-12-16 RX ADMIN — ONDANSETRON HYDROCHLORIDE 4 MG: 2 SOLUTION INTRAMUSCULAR; INTRAVENOUS at 13:33

## 2019-12-16 RX ADMIN — FAMOTIDINE 20 MG: 10 INJECTION INTRAVENOUS at 10:55

## 2019-12-16 RX ADMIN — LIDOCAINE HYDROCHLORIDE 100 MG: 20 INJECTION, SOLUTION INFILTRATION; PERINEURAL at 11:48

## 2019-12-16 RX ADMIN — BUPIVACAINE HYDROCHLORIDE 18 ML: 2.5 INJECTION, SOLUTION EPIDURAL; INFILTRATION; INTRACAUDAL; PERINEURAL at 10:55

## 2019-12-16 RX ADMIN — FENTANYL CITRATE 50 MCG: 50 INJECTION, SOLUTION INTRAMUSCULAR; INTRAVENOUS at 14:25

## 2019-12-16 RX ADMIN — PROPOFOL 200 MG: 10 INJECTION, EMULSION INTRAVENOUS at 11:48

## 2019-12-16 RX ADMIN — HYDROCODONE BITARTRATE AND ACETAMINOPHEN 2 TABLET: 7.5; 325 TABLET ORAL at 14:07

## 2019-12-16 RX ADMIN — ONDANSETRON 4 MG: 2 INJECTION INTRAMUSCULAR; INTRAVENOUS at 15:07

## 2019-12-16 RX ADMIN — CEFAZOLIN SODIUM 2 G: 2 INJECTION, SOLUTION INTRAVENOUS at 11:45

## 2019-12-16 RX ADMIN — PROMETHAZINE HYDROCHLORIDE 25 MG: 25 TABLET ORAL at 14:07

## 2019-12-16 RX ADMIN — SODIUM CHLORIDE, POTASSIUM CHLORIDE, SODIUM LACTATE AND CALCIUM CHLORIDE 9 ML/HR: 600; 310; 30; 20 INJECTION, SOLUTION INTRAVENOUS at 09:30

## 2019-12-16 RX ADMIN — BUPIVACAINE 2 ML: 13.3 INJECTION, SUSPENSION, LIPOSOMAL INFILTRATION at 10:55

## 2019-12-16 RX ADMIN — SODIUM CHLORIDE, POTASSIUM CHLORIDE, SODIUM LACTATE AND CALCIUM CHLORIDE: 600; 310; 30; 20 INJECTION, SOLUTION INTRAVENOUS at 12:17

## 2019-12-16 NOTE — DISCHARGE INSTRUCTIONS
What to expect after a Nerve Block    Nerve blocks administered to block pain affect many types of nerves, including those nerves that control movement, pain, and normal sensation. Following a nerve block, you may notice some bruising at the site where the block was given. You may experience sensations such as: numbness of the affected area or limb, tingling, heaviness (that is the limb feels heavy to you), weakness or inability to move the affected arm or leg, or a feeling as if your arm or leg has “fallen asleep.”     A nerve block can last from 2 to 36 hours depending on the medications used.  Usually the weakness wears off first followed by the tingling and heaviness. As the block wears off, you may begin to notice pain; however, this sequence of events may occur in any order. Typically, you will be able to move your limb before you will feel it. Once a nerve block begins to wear off, the effects are usually completely gone within 60 minutes.  If you experience continued side effects that you believe are block related for longer than 48 hours, please call your healthcare provider. Please see block-specific instructions below.    Instructions for any Block involving the leg/foot:   If you have had a leg /foot block, you should not bear weight on the affected leg until the block has worn off. After the block has worn off, weight bearing should be as directed by your surgeon. You may be sent home with crutches. You are at high risk for falling because of the anesthetic effects on your leg. Please use caution when standing or trying to move or walk. Have someone assist you until your leg and foot function have returned to normal.     Protection of a “blocked” limb  • After a nerve block, you cannot feel pain, pressure, or extremes of temperature in the affected limb. And because of this, your blocked limb is at more risk for injury. For example, it is possible to burn your limb on an extremely hot surface  without feeling it.     • When resting, it is important to reposition your limb periodically to avoid prolonged pressure on it. This may require the use of pillows and padding.    • While sleeping, you should avoid rolling onto the affected limb or putting too much pressure on it.     • If you have a cast or tight dressing, check the color of your fingers or toes of the affected limb. Call your surgeon if they look discolored (that is, dusky, dark colored).    • Use caution in cold weather. Cover your limb appropriately to protect it from the cold.    Pain Management:  Your surgeon will give you a prescription for pain medication. Begin taking this before the nerve block wears off. Bear in mind that sometimes the block can wear off in the middle of the night.     WHAT YOU CAN EXPECT  1. Swelling:  This is to be expected. It is difficult to specify what constitutes as an abnormal amount of swelling. You can minimize this by staying off your feet, keeping the foot elevated and applying ice.  2. Pain:  Everyone experiences pain, unfortunately, some worse than others.  You will be given a prescription for pain medication before you leave the hospital, either by Dr. Ledezma or one of his assistants.  Please feel free to check with us if you are allergic to any pain medication. If you have had local anesthesia, start taking the medication when the anesthesia begins to wear off.  3. Bleeding:  This always occurs.  You will notice slight oozing occasionally through the bandages.  If bleeding continues and soaks through the dressing please call us.    WHAT TO DO AFTER SURGERY  1. When you return home you must rest.  You may either sit in a chair or in bed, with the foot elevated.  The position of the foot should be above the level of the heart.  Propping the foot up on a few pillows always helps.  2. Do not do any excessive or unnecessary walking during the first few days after surgery.  May get up during the first 48 hours  only to eat and use the restroom.  Each operation is slightly different, and you may be specifically told that no walking is allowed at all for a period of time.  Under these circumstances, you will be best off using a crutch or walker.  3. If you are given a special shoe to use after surgery, you may not walk without it.  You do not, however, have to wear the shoe at night.  You will find it easier to commence walking on your heel and put more weight on the flat foot over the course of the next few days.  4. Use ice over the foot for about 24 hours after surgery.  The small commercially available ice packs are useless.  Fill a large garbage bag with ice and prop the bag over the foot, not on the ankle.  Alternatively, place the ice bag on the bed and turn on your side with the foot lying directly on the ice pack.  5. If you are taking pain medication, there are common side effects such as dizziness, drowsiness, and nausea.  If these or an allergic reaction occur, stop taking the medication and call us.  To prevent nausea, eat prior to taking the medication.    DO NOT DO ANY OF THE FOLLOWING  1. Do not get the bandages wet.  When bathing, either sponge bathe or hang the foot over the side of the bath.  The safest is to get into the empty tub with the shoe on, elevate the foot, and then fill the tub.  Preferably, empty the tub prior to getting out.  Showering is possible with commercial plastic protectors.  2. Don't remove your dressing unless specifically instructed to do so. You may loosen the elastic wrap if needed for tightness.    *Please understand that the postoperative course varies from patient to patient, and these are only meant as guidelines to a smoother recovery.  These instructions do not cover all aspects of your post-operative care and recovery and if you have any questions which you feel are unanswered by this instruction sheet, please call us.    Please call us if you develop a fever associated  with redness or drainage around the wound.  We are interested in your prompt and healthy recovery.  Please cooperate with the above instructions. Please call the office for a follow-up appt at [phone].

## 2019-12-16 NOTE — ANESTHESIA POSTPROCEDURE EVALUATION
Patient: Loan Pena    Procedure Summary     Date:  12/16/19 Room / Location:   [facility]    Anesthesia Start:  1145 Anesthesia Stop:  1346    Procedures:       LEFT PERONEUS BREVIS TO LONGUS TENDON TRANSFER LEFT (KRISTEN) TENDOACHILLES LENGTHENING (Left Ankle)      MULTIPLE LEFT MIDFOOT ARTHRODESIS WITH OSTEOTOMY AND BONE GRAFT (Left Foot) Diagnosis:      Surgeon:  Awais Ledezma MD Provider:  Giovany Mcelroy MD    Anesthesia Type:  general ASA Status:  3          Anesthesia Type: general    Vitals  Vitals Value Taken Time   /91 12/16/2019  2:30 PM   Temp 36.2 °C (97.1 °F) 12/16/2019  1:43 PM   Pulse 66 12/16/2019  2:39 PM   Resp     SpO2 98 % 12/16/2019  2:39 PM   Vitals shown include unvalidated device data.        Post Anesthesia Care and Evaluation    Patient location during evaluation: bedside  Patient participation: complete - patient participated  Level of consciousness: awake  Pain score: 1  Pain management: adequate  Airway patency: patent  Anesthetic complications: No anesthetic complications  PONV Status: controlled  Cardiovascular status: acceptable  Respiratory status: acceptable  Hydration status: acceptable    Comments: --------------------            12/16/19               1430     --------------------   BP:       164/91     Pulse:      68       Resp:                Temp:                SpO2:      99%      --------------------

## 2019-12-16 NOTE — OP NOTE
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  Parenteral antibiotics were given and a surgical pause was undertaken to identify appropriate patient,  surgical site, and surgeon.  Appropriate anesthesia was administered and a well-padded left proximal thigh tourniquet applied.  Prep and drape of the left lower extremity were done in the usual sterile free fashion.  The limb was elevated and partially exsanguinated by gravity.  The tourniquet was inflated to 250 mmHg.    The first incision was 3-1/2 cm long just posterior to the fibula at the supramalleolar level.  Blunt dissection was taken deep to the dermis protecting cuticular nerves.  The sheath overlying the peroneal tendons was opened and identified the peroneus brevis.  The peroneus brevis was then tenotomized and a Pulvertaft weave pattern reinforced with inverted 0 Ethibond sutures was used to transfer the peroneus brevis, a deforming force, into the peroneus longus tendon.  This wound was irrigated and hemostasis checked.  It was closed with 2-0 Vicryl and staples.    Next, a judicious lengthening of the tendo Achilles is done with three incisions with lengthening to 20 degrees dorsiflexion at the ankle.  Tendon was palpated to be intact at its new length after the procedure.  Ankle dorsiflexion was 40 degrees at the end of the procedure.    Next, a medial midline longitudinal incision was made starting at the apex  of the patient's deformity in the midfoot.  Blunt dissection was taken deep to the dermis using cautery as needed for hemostasis.  The capsule of the first TMT as well as naviculocuneiform joints were opened.  Osteophytes were removed as were loose bodies.  Her bone was found to be somewhat osteopenic, given the degree of osteoarthritis she had.  I exposed all the joints to be fused without stripping away dorsal lateral or all of the plantar soft tissue attachments.    Retractors were placed and a microsagittal saw used to make a plantar osteotomy removing osteophytes and sagging plantar bone at multiple levels along the midfoot.  I was then able to perform a closing wedge and slight abduction type osteotomy that was biplanar through the arthritic intertarsal joints at the midfoot.  I was able to remove disease cartilage and bone and loose bodies for the first tarsometatarsal, the naviculocuneiform, the second tarsometatarsal, the first intermetatarsal, and the articulation between the medial cuneiform and base of the second metatarsal.  The wound was irrigated as it went along.  A lamina  was used to facilitate the exposure.  Chisel curette and rongeur were used to prepare the joint surfaces.  After irrigation and ensuring hemostasis, I made dozens of fenestrations with a drill across both sides of all fusion sites.    Bone graft was then harvested from the foot in a separate area shape by hand and packed into these fusion sites.  Using fluoroscopic guidance, an Ortho helix 4 mm cannulated screw was passed from the first metatarsal into the midfoot to give axial compression.  I next contoured a plantar reconstruction 8 hole Ortho helix plate to the medial column of this foot.  Using fluoroscopic guidance.  Fixation of the plate to the bone was done with 4mm cancellus screws lagging fusion sites when possible through the plate.  I took advantage of the compression slot in the plate to add further compression  across the medial column fusion site.    Further bone graft was tamped in place.  Permanent x-rays in multiple projections as well as fluoroscopic images were consistent with satisfactory alignment reduction position and fixation.  The wound was then closed in layers so that the skin edges could be apposed and everted without tension using staples.  Sterile dressing was applied including posterior plaster splint over padding Ace bandages over the top.  Sponge and needle counts were correct.  Patient left the operating room comfortable with her block in effect after having tolerated the procedure well.

## 2019-12-16 NOTE — ANESTHESIA PROCEDURE NOTES
Peripheral Block      Patient reassessed immediately prior to procedure    Patient location during procedure: pre-op  Start time: 12/16/2019 10:34 AM  Stop time: 12/16/2019 10:46 AM  Reason for block: at surgeon's request and post-op pain management  Performed by  Anesthesiologist: Awais Blair MD  Preanesthetic Checklist  Completed: patient identified, site marked, surgical consent, pre-op evaluation, timeout performed, IV checked, risks and benefits discussed and monitors and equipment checked  Prep:  Pt Position: right lateral decubitus  Sterile barriers:gloves and cap  Prep: ChloraPrep  Patient monitoring: blood pressure monitoring, continuous pulse oximetry and EKG  Procedure  Sedation:yes    Guidance:ultrasound guided  ULTRASOUND INTERPRETATION.  Using ultrasound guidance a 21 G gauge needle was placed in close proximity to the sciatic nerve, at which point, under ultrasound guidance anesthetic was injected in the area of the nerve and spread of the anesthesia was seen on ultrasound in close proximity thereto.  There were no abnormalities seen on ultrasound; a digital image was taken; and the patient tolerated the procedure with no complications. Images:still images obtained    Laterality:left  Block Type:popliteal  Injection Technique:single-shot  Needle Type:echogenic  Needle Gauge:21 G  Resistance on Injection: none    Medications Used: bupivacaine liposome (EXPAREL) 1.3 % injection, 8 mL  bupivacaine PF (MARCAINE) 0.25 % injection, 22 mL  Med admintered at 12/16/2019 10:36 AM      Post Assessment  Injection Assessment: negative aspiration for heme, no paresthesia on injection and incremental injection  Patient Tolerance:comfortable throughout block  Complications:no  Additional Notes  Ultrasound interpretation:  Needle was seen in close proximity to nerve.  Local anesthetic seen surrounding area of nerve.  No abnormalities noted.  Still image obtained.

## 2019-12-16 NOTE — ANESTHESIA PREPROCEDURE EVALUATION
Anesthesia Evaluation     Patient summary reviewed and Nursing notes reviewed   NPO Solid Status: > 8 hours  NPO Liquid Status: > 2 hours           Airway   Mallampati: II  TM distance: >3 FB  Neck ROM: full  Dental - normal exam     Pulmonary - negative pulmonary ROS and normal exam    breath sounds clear to auscultation  Cardiovascular - normal exam    Patient on routine beta blocker  Rhythm: regular  Rate: normal    (+) hypertension 2 medications or greater, dysrhythmias (right bundle branch block),   (-) angina, orthopnea, PND, GURROLA      Neuro/Psych- negative ROS  GI/Hepatic/Renal/Endo    (+) obesity,       Musculoskeletal     Abdominal    Substance History - negative use     OB/GYN negative ob/gyn ROS         Other   arthritis,                      Anesthesia Plan    ASA 3     general   (Popliteal and adductor canal block for PO pain)  intravenous induction     Anesthetic plan, all risks, benefits, and alternatives have been provided, discussed and informed consent has been obtained with: patient.

## 2019-12-16 NOTE — H&P
History & Physical       Patient: Loan Pena    Date of Admission: 12/16/2019    YOB: 1950    Medical Record Number: [id number]    Attending Physician: Awais Ledezma MD        Chief Complaints: left midfoot arthritis and peroneal tendon injury      History of Present Illness: 69 y.o. female presents with left midfoot arthritis and peroneal tendon injury. Onset of symptoms was gradual starting unknown years ago.  Symptoms are associated with pain.  Symptoms are aggravated by activity.   Symptoms improve with rest. Patient is now being admitted to the services of Awais Ledezma MD for further evaluation and treatment.     No Known Allergies    Medications:     Home Medications:  No current facility-administered medications on file prior to encounter.      Current Outpatient Medications on File Prior to Encounter   Medication Sig   • cyclobenzaprine (FLEXERIL) 10 MG tablet Take 10 mg by mouth 3 (Three) Times a Day As Needed for Muscle Spasms.   • lisinopril-hydrochlorothiazide (PRINZIDE,ZESTORETIC) 10-12.5 MG per tablet Take 1 tablet by mouth Daily.     Current Medications:  Scheduled Meds:  Continuous Infusions:  No current facility-administered medications for this encounter.   PRN Meds:.       Past Medical History:   Diagnosis Date   • Arthritis     OSTEO   • History of peptic ulcer disease     TREATED PREVACID, AMOXICILLIN AND PEPTOBISMOL    • Hypertension    • Left foot pain         Past Surgical History:   Procedure Laterality Date   • ACHILLES TENDON SURGERY Right 5/20/2019    Procedure: RIGHT PERONEAL TENDON TRANSFER RIGHT TENDONACHILLES LENGTHENING;  Surgeon: Awais Ledezma MD;  Location: [facility] OR [facility] [place];  Service: Orthopedics   • ANKLE FUSION Right 5/20/2019    Procedure: MULITPLE RIGHT MID FOOT ARTHRODESIS WITH BONE GRAFT REALIGNMENT OSTEOTMIES;  Surgeon: Awais Ledezma MD;  Location: [facility] OR [facility] [place];  Service: Orthopedics   • BRANCHIAL CLEFT CYST EXCISION Right    • COLONOSCOPY      • ENDOSCOPY     • LASIK Bilateral    • SINUS SURGERY          Social History     Occupational History   • Not on file   Tobacco Use   • Smoking status: Never Smoker   • Smokeless tobacco: Never Used   Substance and Sexual Activity   • Alcohol use: No     Frequency: Never   • Drug use: No   • Sexual activity: Defer    Social History     Social History Narrative   • Not on file        Family History   Problem Relation Age of Onset   • Malig Hyperthermia Neg Hx          Review of Systems:   HEENT: Patient denies any headaches, vision changes, change in hearing, or tinnitus, Patient denies any rhinorrhea,epistaxis, sinus pain, mouth or dental problems, sore throat or hoarseness, or dysphagia  Pulmonary: Patient denies any cough, congestion, SOA, or wheezing  Cardiovascular: Patient denies any chest pain, dyspnea, palpitations, weakness, intolerance of exercise, varicosities, swelling of extremities, known murmur  Gastrointestinal:  Patient denies nausea, vomiting, diarrhea, constipation, loss  of appetite, change in appetite, dysphagia, gas, heartburn, melena, change in bowel habits, use of laxatives or other drugs to alter the function of the gastrointestinal tract.  Genital/Urinary: Patient denies dysuria, change in color of urine, change in frequency of urination, pain with urgency, incontinence, retention, or nocturia.  Musculoskeletal: With the exception of the involved extremity, the patient denies increased warmth; redness; or swelling of joints; limitation of function; deformity; crepitation: pain in a joint or an extremity, the neck, or the back, especially with movement.  Neurological: Patient denies dizziness, tremor, ataxia, difficulty in speaking, change in speech, paresthesia, loss of sensation, seizures, syncope, changes in memory.  Endocrine system: Patient denies tremors, palpitations, intolerance of heat or cold, polyuria, polydipsia, polyphagia, diaphoresis, exophthalmos, or goiter.  Psychological:  Patient denies thoughts/plans or harming self or other; depression,  insomnia, night terrors, edilia, memory loss, disorientation.  Skin: Patient denies any bruising, rashes, discoloration, pruritus, wounds, or changes in the hair or nails.  Hematopoietic: Patient denies history of spontaneous or excessive bleeding, epistaxis, hematuria, melena, fatigue, enlarged or tender lymph nodes, pallor, history of anemia.    Physical Exam: 69 y.o. female  General Appearance:    Alert, cooperative, in no acute distress                    There were no vitals filed for this visit.     Head:    Normocephalic, without obvious abnormality, atraumatic   Eyes:            Lids and lashes normal, conjunctivae and sclerae normal, no   icterus, no pallor, corneas clear, PERRLA   Ears:    Ears appear intact with no abnormalities noted   Throat:   No oral lesions, no thrush, oral mucosa moist   Neck:   No adenopathy, supple, trachea midline, no thyromegaly, no   carotid bruit, no JVD   Back:     No kyphosis present, no scoliosis present, no skin lesions,      erythema or scars, no tenderness to percussion or                   palpation,   range of motion normal   Lungs:     Clear to auscultation,respirations regular, even and                  unlabored    Heart:    Regular rhythm and normal rate, normal S1 and S2, no            murmur, no gallop, no rub, no click   Chest Wall:    No abnormalities observed   Abdomen:     Normal bowel sounds, no masses, no organomegaly, soft        non-tender, non-distended, no guarding, no rebound                tenderness   Rectal:     Deferred   Extremities:   Tenderness over left  . Moves all extremities well, no edema,   no cyanosis, no redness   Pulses:   Pulses palpable and equal bilaterally   Skin:   No bleeding, bruising or rash   Lymph nodes:   No palpable adenopathy   Neurologic:   Cranial nerves 2 - 12 grossly intact, sensation intact, DTR       present and equal bilaterally           Diagnostic  Tests:              No results found for: CRYSTAL]  No results found for: CULTURE]  No results found for: URICACID]    No results found.    Assessment:  Patient Active Problem List   Diagnosis   • Osteoarthritis of ankle and foot, right   • Osteoarthritis           Plan:  The patient voiced understanding of the risks, benefits, and alternative forms of treatment that were discussed. All questions were answered and the patient consents to proceed with the procedure as planned.        Discharge Plan: today to home      Date: 12/16/2019    Awais Ledezma MD

## 2019-12-16 NOTE — ANESTHESIA PROCEDURE NOTES
Peripheral Block      Patient reassessed immediately prior to procedure    Patient location during procedure: pre-op  Start time: 12/16/2019 10:46 AM  Stop time: 12/16/2019 10:55 AM  Reason for block: at surgeon's request and post-op pain management  Performed by  Anesthesiologist: Awais Blair MD  Preanesthetic Checklist  Completed: patient identified, site marked, surgical consent, pre-op evaluation, timeout performed, IV checked, risks and benefits discussed and monitors and equipment checked  Prep:  Pt Position: supine  Sterile barriers:gloves and cap  Prep: ChloraPrep  Patient monitoring: blood pressure monitoring, continuous pulse oximetry and EKG  Procedure  Sedation:yes    Guidance:ultrasound guided  ULTRASOUND INTERPRETATION.  Using ultrasound guidance a 21 G gauge needle was placed in close proximity to the femoral nerve, at which point, under ultrasound guidance anesthetic was injected in the area of the nerve and spread of the anesthesia was seen on ultrasound in close proximity thereto.  There were no abnormalities seen on ultrasound; a digital image was taken; and the patient tolerated the procedure with no complications. Images:still images obtained    Laterality:left  Block Type:adductor canal block  Injection Technique:single-shot  Needle Type:echogenic  Needle Gauge:21 G  Resistance on Injection: none    Medications Used: bupivacaine PF (MARCAINE) 0.25 % injection, 18 mL  bupivacaine liposome (EXPAREL) 1.3 % injection, 2 mL  Med admintered at 12/16/2019 10:55 AM      Post Assessment  Injection Assessment: negative aspiration for heme, no paresthesia on injection and incremental injection  Patient Tolerance:comfortable throughout block  Complications:no  Additional Notes  Ultrasound interpretation:  Needle was seen in close proximity to nerve.  Local anesthetic seen surrounding area of nerve.  No abnormalities noted.  Still image obtained.

## 2021-09-09 NOTE — PLAN OF CARE
Problem: Patient Care Overview  Goal: Plan of Care Review  Outcome: Ongoing (interventions implemented as appropriate)   05/21/19 0317   Coping/Psychosocial   Plan of Care Reviewed With patient   Plan of Care Review   Progress improving   OTHER   Outcome Summary HTN well controlled on PO well. pain well controlled.       Problem: Fall Risk (Adult)  Goal: Absence of Fall  Outcome: Outcome(s) achieved Date Met: 05/21/19 05/21/19 0317   Fall Risk (Adult)   Absence of Fall making progress toward outcome          [place] Lips Filler Volume In Cc: 0.4

## (undated) DEVICE — SPLNT PLSTR ORTHO 5IN

## (undated) DEVICE — PK ORTHO MINOR TOWER 40

## (undated) DEVICE — BANDAGE,GAUZE,BULKEE II,4.5"X4.1YD,STRL: Brand: MEDLINE

## (undated) DEVICE — SPNG LAP 18X18IN LF STRL PK/5

## (undated) DEVICE — UNDYED BRAIDED (POLYGLACTIN 910), SYNTHETIC ABSORBABLE SUTURE: Brand: COATED VICRYL

## (undated) DEVICE — SUT PROLN 3/0 FS2 18IN 8665G

## (undated) DEVICE — PRECISION THIN (9.0 X 0.38 X 25.0MM)

## (undated) DEVICE — BNDG ELAS ELITE V/CLOSE 6IN 5YD LF STRL

## (undated) DEVICE — GW MAXTORQUE BLNT 1.4MM GLD
Type: IMPLANTABLE DEVICE | Site: ANKLE | Status: NON-FUNCTIONAL
Removed: 2019-12-16

## (undated) DEVICE — DRP C/ARM 41X74IN

## (undated) DEVICE — TOWEL,OR,DSP,ST,BLUE,STD,4/PK,20PK/CS: Brand: MEDLINE

## (undated) DEVICE — DRSNG GZ CURAD XEROFORM NONADHS 5X9IN STRL

## (undated) DEVICE — GLV SURG PREMIERPRO ORTHO LTX PF SZ8 BRN

## (undated) DEVICE — SYR LL TP 10ML STRL

## (undated) DEVICE — STCKNT IMPERV 12IN STRL

## (undated) DEVICE — PREMIUM WET SKIN PREP TRAY: Brand: MEDLINE INDUSTRIES, INC.

## (undated) DEVICE — BIT DRL MAXLOCK EXTRM STD 2.7MM

## (undated) DEVICE — PAD CAST SPECIST 4IN NS

## (undated) DEVICE — SUT PROLN 4/0 FS2 18IN 8683G

## (undated) DEVICE — GLV SURG BIOGEL LTX PF 6 1/2

## (undated) DEVICE — GW MAXTORQUE THRD 1.4MM GLD

## (undated) DEVICE — BIT DRL MAXTORQUE 2.7MM NS GLD

## (undated) DEVICE — DISPOSABLE TOURNIQUET CUFF SINGLE BLADDER, SINGLE PORT AND QUICK CONNECT CONNECTOR: Brand: COLOR CUFF

## (undated) DEVICE — INTENDED FOR TISSUE SEPARATION, AND OTHER PROCEDURES THAT REQUIRE A SHARP SURGICAL BLADE TO PUNCTURE OR CUT.: Brand: BARD-PARKER ® CARBON RIB-BACK BLADES

## (undated) DEVICE — PROXIMATE RH ROTATING HEAD SKIN STAPLERS (35 WIDE) CONTAINS 35 STAINLESS STEEL STAPLES: Brand: PROXIMATE

## (undated) DEVICE — UNDERCAST PADDING: Brand: DEROYAL

## (undated) DEVICE — GLV SURG SENSICARE PI PF LF 8.5 GRN STRL

## (undated) DEVICE — SUT VIC 2/0 X1 27IN J459H

## (undated) DEVICE — ENCORE® LATEX ORTHO SIZE 8, STERILE LATEX POWDER-FREE SURGICAL GLOVE: Brand: ENCORE

## (undated) DEVICE — NDL HYPO ECLPS SFTY 18G 1 1/2IN

## (undated) DEVICE — SUT VIC 2/0 CT2 27IN J269H

## (undated) DEVICE — PAD,ABDOMINAL,8"X10",ST,LF: Brand: MEDLINE

## (undated) DEVICE — NDL HYPO PRECISIONGLIDE REG 25G 1 1/2

## (undated) DEVICE — IMPLANTABLE DEVICE
Type: IMPLANTABLE DEVICE | Site: FOOT | Status: NON-FUNCTIONAL
Removed: 2019-05-20

## (undated) DEVICE — GW MAXTORQUE BLNT 1.4MM GLD
Type: IMPLANTABLE DEVICE | Site: ANKLE | Status: NON-FUNCTIONAL
Removed: 2019-05-20